# Patient Record
Sex: MALE | Race: WHITE | Employment: OTHER | RURAL
[De-identification: names, ages, dates, MRNs, and addresses within clinical notes are randomized per-mention and may not be internally consistent; named-entity substitution may affect disease eponyms.]

---

## 2017-01-25 ENCOUNTER — OFFICE VISIT (OUTPATIENT)
Dept: FAMILY MEDICINE CLINIC | Age: 52
End: 2017-01-25
Payer: MEDICARE

## 2017-01-25 VITALS
RESPIRATION RATE: 20 BRPM | BODY MASS INDEX: 34.54 KG/M2 | HEIGHT: 72 IN | SYSTOLIC BLOOD PRESSURE: 131 MMHG | HEART RATE: 72 BPM | WEIGHT: 255 LBS | DIASTOLIC BLOOD PRESSURE: 75 MMHG

## 2017-01-25 DIAGNOSIS — R11.0 NAUSEA: ICD-10-CM

## 2017-01-25 DIAGNOSIS — L57.0 ACTINIC KERATOSIS: ICD-10-CM

## 2017-01-25 DIAGNOSIS — H53.8 BLURRED VISION: ICD-10-CM

## 2017-01-25 DIAGNOSIS — I10 ESSENTIAL HYPERTENSION: ICD-10-CM

## 2017-01-25 DIAGNOSIS — M06.9 RHEUMATOID ARTHRITIS, INVOLVING UNSPECIFIED SITE, UNSPECIFIED RHEUMATOID FACTOR PRESENCE: Primary | ICD-10-CM

## 2017-01-25 DIAGNOSIS — G44.85 PRIMARY STABBING HEADACHE: ICD-10-CM

## 2017-01-25 PROCEDURE — 99214 OFFICE O/P EST MOD 30 MIN: CPT | Performed by: FAMILY MEDICINE

## 2017-01-25 RX ORDER — PROMETHAZINE HYDROCHLORIDE 25 MG/1
25 TABLET ORAL EVERY 6 HOURS PRN
Qty: 28 TABLET | Refills: 0 | Status: SHIPPED | OUTPATIENT
Start: 2017-01-25 | End: 2018-05-14 | Stop reason: ALTCHOICE

## 2017-01-25 RX ORDER — LISINOPRIL 5 MG/1
5 TABLET ORAL 2 TIMES DAILY
Qty: 60 TABLET | Refills: 3 | Status: SHIPPED | OUTPATIENT
Start: 2017-01-25 | End: 2017-04-25 | Stop reason: SDUPTHER

## 2017-01-25 RX ORDER — DIAZEPAM 5 MG/1
5 TABLET ORAL EVERY 12 HOURS PRN
Qty: 60 TABLET | Refills: 3 | Status: SHIPPED | OUTPATIENT
Start: 2017-01-25 | End: 2017-02-24

## 2017-01-25 ASSESSMENT — ENCOUNTER SYMPTOMS
VOMITING: 0
BACK PAIN: 1
ALLERGIC/IMMUNOLOGIC NEGATIVE: 1
NAUSEA: 0

## 2017-03-09 ENCOUNTER — OFFICE VISIT (OUTPATIENT)
Dept: FAMILY MEDICINE CLINIC | Age: 52
End: 2017-03-09
Payer: MEDICARE

## 2017-03-09 VITALS
HEART RATE: 81 BPM | RESPIRATION RATE: 18 BRPM | TEMPERATURE: 99.1 F | DIASTOLIC BLOOD PRESSURE: 76 MMHG | SYSTOLIC BLOOD PRESSURE: 133 MMHG | WEIGHT: 258 LBS | HEIGHT: 73 IN | BODY MASS INDEX: 34.19 KG/M2 | OXYGEN SATURATION: 96 %

## 2017-03-09 DIAGNOSIS — J34.89 SINUS DRAINAGE: ICD-10-CM

## 2017-03-09 DIAGNOSIS — J30.89 ALLERGIC RHINITIS DUE TO OTHER ALLERGEN: Primary | ICD-10-CM

## 2017-03-09 DIAGNOSIS — R05.9 COUGH: ICD-10-CM

## 2017-03-09 PROCEDURE — 96372 THER/PROPH/DIAG INJ SC/IM: CPT | Performed by: NURSE PRACTITIONER

## 2017-03-09 PROCEDURE — 99214 OFFICE O/P EST MOD 30 MIN: CPT | Performed by: NURSE PRACTITIONER

## 2017-03-09 RX ORDER — FLUTICASONE PROPIONATE 50 MCG
1 SPRAY, SUSPENSION (ML) NASAL DAILY
Qty: 1 BOTTLE | Refills: 3 | Status: SHIPPED | OUTPATIENT
Start: 2017-03-09

## 2017-03-09 RX ORDER — METHYLPREDNISOLONE ACETATE 80 MG/ML
120 INJECTION, SUSPENSION INTRA-ARTICULAR; INTRALESIONAL; INTRAMUSCULAR; SOFT TISSUE ONCE
Status: COMPLETED | OUTPATIENT
Start: 2017-03-09 | End: 2017-03-09

## 2017-03-09 RX ORDER — LORATADINE 10 MG/1
10 TABLET ORAL DAILY
Qty: 30 TABLET | Refills: 5 | Status: SHIPPED | OUTPATIENT
Start: 2017-03-09 | End: 2017-10-04 | Stop reason: SDUPTHER

## 2017-03-09 RX ORDER — HYDROCODONE POLISTIREX AND CHLORPHENIRAMINE POLISTIREX 10; 8 MG/5ML; MG/5ML
5 SUSPENSION, EXTENDED RELEASE ORAL EVERY 12 HOURS PRN
Qty: 120 ML | Refills: 0 | Status: SHIPPED | OUTPATIENT
Start: 2017-03-09 | End: 2017-09-11 | Stop reason: SDUPTHER

## 2017-03-09 RX ORDER — CEPHALEXIN 500 MG/1
500 CAPSULE ORAL 4 TIMES DAILY
Qty: 40 CAPSULE | Refills: 0 | Status: SHIPPED | OUTPATIENT
Start: 2017-03-09 | End: 2017-03-19

## 2017-03-09 RX ADMIN — METHYLPREDNISOLONE ACETATE 120 MG: 80 INJECTION, SUSPENSION INTRA-ARTICULAR; INTRALESIONAL; INTRAMUSCULAR; SOFT TISSUE at 11:17

## 2017-03-09 ASSESSMENT — ENCOUNTER SYMPTOMS
SINUS PRESSURE: 0
WHEEZING: 1
RHINORRHEA: 1
NAUSEA: 0
VOMITING: 0
BACK PAIN: 1
COUGH: 1
SORE THROAT: 0

## 2017-04-25 ENCOUNTER — HOSPITAL ENCOUNTER (OUTPATIENT)
Dept: OTHER | Age: 52
Discharge: HOME | End: 2017-04-25
Attending: NURSE PRACTITIONER | Admitting: NURSE PRACTITIONER

## 2017-04-25 ENCOUNTER — OFFICE VISIT (OUTPATIENT)
Dept: FAMILY MEDICINE CLINIC | Age: 52
End: 2017-04-25
Payer: MEDICARE

## 2017-04-25 VITALS
DIASTOLIC BLOOD PRESSURE: 78 MMHG | OXYGEN SATURATION: 96 % | HEART RATE: 72 BPM | RESPIRATION RATE: 18 BRPM | SYSTOLIC BLOOD PRESSURE: 130 MMHG

## 2017-04-25 DIAGNOSIS — I10 ESSENTIAL HYPERTENSION: Primary | ICD-10-CM

## 2017-04-25 DIAGNOSIS — M79.7 FIBROMYALGIA: ICD-10-CM

## 2017-04-25 DIAGNOSIS — J34.89 SINUS DRAINAGE: ICD-10-CM

## 2017-04-25 DIAGNOSIS — F41.9 ANXIETY: ICD-10-CM

## 2017-04-25 PROCEDURE — 99214 OFFICE O/P EST MOD 30 MIN: CPT | Performed by: NURSE PRACTITIONER

## 2017-04-25 RX ORDER — DIAZEPAM 5 MG/1
5 TABLET ORAL EVERY 12 HOURS PRN
Qty: 60 TABLET | Refills: 0 | Status: SHIPPED | OUTPATIENT
Start: 2017-04-25 | End: 2017-06-27 | Stop reason: SDUPTHER

## 2017-04-25 RX ORDER — LISINOPRIL 5 MG/1
5 TABLET ORAL 2 TIMES DAILY
Qty: 60 TABLET | Refills: 3 | Status: SHIPPED | OUTPATIENT
Start: 2017-04-25 | End: 2018-02-01 | Stop reason: SDUPTHER

## 2017-04-25 ASSESSMENT — ENCOUNTER SYMPTOMS
ALLERGIC/IMMUNOLOGIC NEGATIVE: 1
SHORTNESS OF BREATH: 0
EYES NEGATIVE: 1
BLURRED VISION: 0
GASTROINTESTINAL NEGATIVE: 1
ORTHOPNEA: 0

## 2017-04-28 DIAGNOSIS — I10 ESSENTIAL HYPERTENSION: ICD-10-CM

## 2017-05-26 ENCOUNTER — OFFICE VISIT (OUTPATIENT)
Dept: FAMILY MEDICINE CLINIC | Age: 52
End: 2017-05-26
Payer: MEDICARE

## 2017-05-26 VITALS
DIASTOLIC BLOOD PRESSURE: 84 MMHG | SYSTOLIC BLOOD PRESSURE: 129 MMHG | OXYGEN SATURATION: 96 % | RESPIRATION RATE: 18 BRPM | HEART RATE: 75 BPM

## 2017-05-26 DIAGNOSIS — R06.02 SHORTNESS OF BREATH ON EXERTION: Primary | ICD-10-CM

## 2017-05-26 DIAGNOSIS — R94.31 EKG, ABNORMAL: ICD-10-CM

## 2017-05-26 DIAGNOSIS — F41.9 ANXIETY: ICD-10-CM

## 2017-05-26 DIAGNOSIS — M06.9 RHEUMATOID ARTHRITIS, INVOLVING UNSPECIFIED SITE, UNSPECIFIED RHEUMATOID FACTOR PRESENCE: ICD-10-CM

## 2017-05-26 PROCEDURE — 99214 OFFICE O/P EST MOD 30 MIN: CPT | Performed by: NURSE PRACTITIONER

## 2017-05-26 PROCEDURE — 93000 ELECTROCARDIOGRAM COMPLETE: CPT | Performed by: NURSE PRACTITIONER

## 2017-05-26 RX ORDER — DIAZEPAM 5 MG/1
5 TABLET ORAL EVERY 12 HOURS PRN
Qty: 60 TABLET | Refills: 0 | Status: CANCELLED | OUTPATIENT
Start: 2017-05-26 | End: 2017-06-25

## 2017-05-26 ASSESSMENT — ENCOUNTER SYMPTOMS
SHORTNESS OF BREATH: 1
CHEST TIGHTNESS: 0
GASTROINTESTINAL NEGATIVE: 1
WHEEZING: 0
ORTHOPNEA: 0
EYES NEGATIVE: 1
STRIDOR: 0
ALLERGIC/IMMUNOLOGIC NEGATIVE: 1

## 2017-06-27 ENCOUNTER — HOSPITAL ENCOUNTER (OUTPATIENT)
Dept: OTHER | Age: 52
Discharge: HOME | End: 2017-06-27
Attending: NURSE PRACTITIONER | Admitting: NURSE PRACTITIONER

## 2017-06-27 ENCOUNTER — OFFICE VISIT (OUTPATIENT)
Dept: FAMILY MEDICINE CLINIC | Age: 52
End: 2017-06-27
Payer: MEDICARE

## 2017-06-27 VITALS
DIASTOLIC BLOOD PRESSURE: 95 MMHG | OXYGEN SATURATION: 98 % | RESPIRATION RATE: 18 BRPM | WEIGHT: 276 LBS | HEART RATE: 84 BPM | SYSTOLIC BLOOD PRESSURE: 139 MMHG | BODY MASS INDEX: 36.58 KG/M2 | HEIGHT: 73 IN

## 2017-06-27 DIAGNOSIS — F41.9 ANXIETY: ICD-10-CM

## 2017-06-27 DIAGNOSIS — R63.5 WEIGHT GAIN: Primary | ICD-10-CM

## 2017-06-27 DIAGNOSIS — Z11.59 NEED FOR HEPATITIS C SCREENING TEST: ICD-10-CM

## 2017-06-27 DIAGNOSIS — R63.5 WEIGHT GAIN: ICD-10-CM

## 2017-06-27 DIAGNOSIS — R63.5 ABNORMAL WEIGHT GAIN: ICD-10-CM

## 2017-06-27 DIAGNOSIS — M06.9 RHEUMATOID ARTHRITIS, INVOLVING UNSPECIFIED SITE, UNSPECIFIED RHEUMATOID FACTOR PRESENCE: ICD-10-CM

## 2017-06-27 DIAGNOSIS — R60.9 EDEMA, UNSPECIFIED TYPE: ICD-10-CM

## 2017-06-27 DIAGNOSIS — I10 ESSENTIAL HYPERTENSION: ICD-10-CM

## 2017-06-27 PROCEDURE — 99214 OFFICE O/P EST MOD 30 MIN: CPT | Performed by: NURSE PRACTITIONER

## 2017-06-27 RX ORDER — HYDROCHLOROTHIAZIDE 25 MG/1
25 TABLET ORAL DAILY
Qty: 30 TABLET | Refills: 0 | Status: SHIPPED | OUTPATIENT
Start: 2017-06-27 | End: 2017-07-11 | Stop reason: SDUPTHER

## 2017-06-27 RX ORDER — DIAZEPAM 5 MG/1
5 TABLET ORAL EVERY 12 HOURS PRN
Qty: 60 TABLET | Refills: 0 | Status: SHIPPED | OUTPATIENT
Start: 2017-06-27 | End: 2017-07-11 | Stop reason: SDUPTHER

## 2017-06-27 ASSESSMENT — ENCOUNTER SYMPTOMS
ALLERGIC/IMMUNOLOGIC NEGATIVE: 1
EYES NEGATIVE: 1
NAUSEA: 0
SHORTNESS OF BREATH: 0
ABDOMINAL PAIN: 0
VOMITING: 0
COUGH: 0
DIARRHEA: 0

## 2017-07-11 ENCOUNTER — OFFICE VISIT (OUTPATIENT)
Dept: FAMILY MEDICINE CLINIC | Age: 52
End: 2017-07-11
Payer: MEDICARE

## 2017-07-11 VITALS
OXYGEN SATURATION: 95 % | SYSTOLIC BLOOD PRESSURE: 128 MMHG | RESPIRATION RATE: 18 BRPM | HEART RATE: 71 BPM | DIASTOLIC BLOOD PRESSURE: 82 MMHG

## 2017-07-11 DIAGNOSIS — R60.9 EDEMA, UNSPECIFIED TYPE: ICD-10-CM

## 2017-07-11 DIAGNOSIS — R25.2 MUSCLE CRAMPS: ICD-10-CM

## 2017-07-11 DIAGNOSIS — I10 ESSENTIAL HYPERTENSION: ICD-10-CM

## 2017-07-11 DIAGNOSIS — F41.9 ANXIETY: Primary | ICD-10-CM

## 2017-07-11 PROCEDURE — 99214 OFFICE O/P EST MOD 30 MIN: CPT | Performed by: NURSE PRACTITIONER

## 2017-07-11 RX ORDER — HYDROCHLOROTHIAZIDE 25 MG/1
25 TABLET ORAL DAILY
Qty: 30 TABLET | Refills: 5 | Status: SHIPPED | OUTPATIENT
Start: 2017-07-11 | End: 2018-06-14

## 2017-07-11 RX ORDER — DIAZEPAM 5 MG/1
5 TABLET ORAL EVERY 12 HOURS PRN
Qty: 60 TABLET | Refills: 0 | Status: SHIPPED | OUTPATIENT
Start: 2017-07-11 | End: 2017-08-11 | Stop reason: SDUPTHER

## 2017-07-11 RX ORDER — METHOCARBAMOL 500 MG/1
500 TABLET, FILM COATED ORAL 4 TIMES DAILY PRN
Qty: 90 TABLET | Refills: 5 | Status: SHIPPED | OUTPATIENT
Start: 2017-07-11 | End: 2017-08-10

## 2017-07-11 ASSESSMENT — ENCOUNTER SYMPTOMS
NAUSEA: 0
COUGH: 0
RESPIRATORY NEGATIVE: 1
ALLERGIC/IMMUNOLOGIC NEGATIVE: 1
ABDOMINAL PAIN: 0
VOMITING: 0
DIARRHEA: 0
EYES NEGATIVE: 1
SHORTNESS OF BREATH: 0

## 2017-08-11 ENCOUNTER — OFFICE VISIT (OUTPATIENT)
Dept: FAMILY MEDICINE CLINIC | Age: 52
End: 2017-08-11
Payer: MEDICARE

## 2017-08-11 VITALS
DIASTOLIC BLOOD PRESSURE: 82 MMHG | SYSTOLIC BLOOD PRESSURE: 110 MMHG | RESPIRATION RATE: 18 BRPM | OXYGEN SATURATION: 96 % | HEART RATE: 89 BPM

## 2017-08-11 DIAGNOSIS — R60.9 EDEMA, UNSPECIFIED TYPE: ICD-10-CM

## 2017-08-11 DIAGNOSIS — F41.9 ANXIETY: Primary | ICD-10-CM

## 2017-08-11 PROCEDURE — 99214 OFFICE O/P EST MOD 30 MIN: CPT | Performed by: NURSE PRACTITIONER

## 2017-08-11 RX ORDER — TRIAMTERENE AND HYDROCHLOROTHIAZIDE 37.5; 25 MG/1; MG/1
TABLET ORAL
Refills: 11 | COMMUNITY
Start: 2017-07-25 | End: 2017-10-10 | Stop reason: ALTCHOICE

## 2017-08-11 RX ORDER — DIAZEPAM 5 MG/1
5 TABLET ORAL EVERY 12 HOURS PRN
Qty: 60 TABLET | Refills: 0 | Status: SHIPPED | OUTPATIENT
Start: 2017-08-11 | End: 2017-09-11 | Stop reason: SDUPTHER

## 2017-08-11 ASSESSMENT — ENCOUNTER SYMPTOMS
GASTROINTESTINAL NEGATIVE: 1
RESPIRATORY NEGATIVE: 1
ALLERGIC/IMMUNOLOGIC NEGATIVE: 1
EYES NEGATIVE: 1

## 2017-09-11 ENCOUNTER — OFFICE VISIT (OUTPATIENT)
Dept: FAMILY MEDICINE CLINIC | Age: 52
End: 2017-09-11
Payer: MEDICARE

## 2017-09-11 VITALS
HEART RATE: 69 BPM | SYSTOLIC BLOOD PRESSURE: 124 MMHG | DIASTOLIC BLOOD PRESSURE: 80 MMHG | OXYGEN SATURATION: 95 % | RESPIRATION RATE: 18 BRPM

## 2017-09-11 DIAGNOSIS — R60.9 PERIPHERAL EDEMA: ICD-10-CM

## 2017-09-11 DIAGNOSIS — F41.9 ANXIETY: Primary | ICD-10-CM

## 2017-09-11 DIAGNOSIS — R05.9 COUGH: ICD-10-CM

## 2017-09-11 PROCEDURE — 99214 OFFICE O/P EST MOD 30 MIN: CPT | Performed by: NURSE PRACTITIONER

## 2017-09-11 RX ORDER — DIAZEPAM 5 MG/1
5 TABLET ORAL EVERY 12 HOURS PRN
Qty: 60 TABLET | Refills: 0 | Status: SHIPPED | OUTPATIENT
Start: 2017-09-11 | End: 2017-10-10 | Stop reason: SDUPTHER

## 2017-09-11 RX ORDER — HYDROCODONE POLISTIREX AND CHLORPHENIRAMINE POLISTIREX 10; 8 MG/5ML; MG/5ML
5 SUSPENSION, EXTENDED RELEASE ORAL EVERY 12 HOURS PRN
Qty: 120 ML | Refills: 0 | Status: SHIPPED | OUTPATIENT
Start: 2017-09-11 | End: 2017-10-10 | Stop reason: ALTCHOICE

## 2017-09-11 ASSESSMENT — ENCOUNTER SYMPTOMS
ALLERGIC/IMMUNOLOGIC NEGATIVE: 1
EYES NEGATIVE: 1
RESPIRATORY NEGATIVE: 1
GASTROINTESTINAL NEGATIVE: 1

## 2017-10-04 DIAGNOSIS — J30.89 ALLERGIC RHINITIS DUE TO OTHER ALLERGEN: ICD-10-CM

## 2017-10-04 DIAGNOSIS — J34.89 SINUS DRAINAGE: ICD-10-CM

## 2017-10-04 RX ORDER — LORATADINE 10 MG/1
10 TABLET ORAL DAILY
Qty: 30 TABLET | Refills: 5 | Status: SHIPPED | OUTPATIENT
Start: 2017-10-04 | End: 2018-06-14

## 2017-10-10 ENCOUNTER — HOSPITAL ENCOUNTER (OUTPATIENT)
Dept: OTHER | Age: 52
Discharge: HOME | End: 2017-10-10
Attending: NURSE PRACTITIONER | Admitting: NURSE PRACTITIONER

## 2017-10-10 ENCOUNTER — OFFICE VISIT (OUTPATIENT)
Dept: FAMILY MEDICINE CLINIC | Age: 52
End: 2017-10-10
Payer: MEDICARE

## 2017-10-10 VITALS
HEIGHT: 73 IN | BODY MASS INDEX: 36.18 KG/M2 | HEART RATE: 67 BPM | DIASTOLIC BLOOD PRESSURE: 76 MMHG | OXYGEN SATURATION: 91 % | SYSTOLIC BLOOD PRESSURE: 118 MMHG | WEIGHT: 273 LBS | RESPIRATION RATE: 18 BRPM

## 2017-10-10 DIAGNOSIS — F41.9 ANXIETY: ICD-10-CM
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
## 2017-10-10 DIAGNOSIS — M25.511 ACUTE PAIN OF RIGHT SHOULDER: ICD-10-CM

## 2017-10-10 DIAGNOSIS — J30.89 ALLERGIC RHINITIS DUE TO OTHER ALLERGEN: ICD-10-CM

## 2017-10-10 PROCEDURE — 99214 OFFICE O/P EST MOD 30 MIN: CPT | Performed by: NURSE PRACTITIONER

## 2017-10-10 RX ORDER — DIAZEPAM 5 MG/1
5 TABLET ORAL EVERY 12 HOURS PRN
Qty: 60 TABLET | Refills: 0 | Status: SHIPPED | OUTPATIENT
Start: 2017-10-10 | End: 2018-01-11 | Stop reason: SDUPTHER

## 2017-10-10 ASSESSMENT — ENCOUNTER SYMPTOMS
EYES NEGATIVE: 1
ALLERGIC/IMMUNOLOGIC NEGATIVE: 1
GASTROINTESTINAL NEGATIVE: 1
RESPIRATORY NEGATIVE: 1

## 2017-10-10 NOTE — PROGRESS NOTES
SUBJECTIVE:    Geovanna Roberts is a 47 y.o. male    Anxiety: Patient complains of evaluation of anxiety disorder. He has the following anxiety symptoms: anxious, nervous. Onset of symptoms was approximately several years ago, stable since that time. He denies current suicidal and homicidal ideation. Family history significant for anxiety and depression. Possible organic causes contributing are: none. Risk factors: positive family history in  mother and negative life event loss of family members. Previous treatment includes benzodiazepines Valium 5mg BID PRN and none. He complains of the following side effects from the treatment: none. Pt reports loratadine was switched to Desloratadine 5mg daily due to formulary change on 10/04/17. Pt reports it doesn't seem to be working as well as loratadine, but he does agree to give it a little longer to work. Pt reports allergies are starting to improve because all the plants are \"dying out\". Pt has an appt with Dr Alfa Salas on 11/2/17 for bilateral shoulder injections due to shoulder arthritis. Shoulder Pain    The pain is present in the right shoulder. This is a new problem. The current episode started 1 to 4 weeks ago (9 days). There has been a history of trauma. The problem occurs constantly. The problem has been gradually improving. The quality of the pain is described as aching. The pain is at a severity of 3/10. The pain is mild. Associated symptoms include joint locking (chronic) and stiffness (chronic). The symptoms are aggravated by lying down. He has tried oral narcotics for the symptoms. The treatment provided significant relief. His past medical history is significant for rheumatoid arthritis. Chief Complaint   Patient presents with    Allergies     Pt states we called in his Claritan the other day and the pharmacy changed it due to insurance not covering it.  He states the new medication is not as good as what he was taking before   [facility] Shoulder Pain     Pt states 9 days ago he fell and hurt his right shoulder. He states he has used biofreeze and tried to relieve pain. He states it is getting some better but not completely better. He would like to get it x-rayd    Anxiety     Refill        Review of Systems   Constitutional: Negative. HENT: Negative. Eyes: Negative. Respiratory: Negative. Cardiovascular: Negative. Gastrointestinal: Negative. Endocrine: Negative. Genitourinary: Negative. Musculoskeletal: Positive for arthralgias (right shoulder) and stiffness (chronic). Skin: Negative. Allergic/Immunologic: Negative. Neurological: Negative. Hematological: Negative. Psychiatric/Behavioral: The patient is nervous/anxious (adequately managed with Valium 5mg BID). OBJECTIVE:    /76   Pulse 67   Resp 18   Ht 6' 1\" (1.854 m)   Wt 273 lb (123.8 kg)   SpO2 91%   BMI 36.02 kg/m²    Physical Exam   Constitutional: He is oriented to person, place, and time. He appears well-developed and well-nourished. HENT:   Head: Normocephalic. Neck: Carotid bruit is not present. No thyromegaly present. Cardiovascular: Normal rate, regular rhythm, normal heart sounds and intact distal pulses. No murmur heard. Pulmonary/Chest: Effort normal and breath sounds normal.   Musculoskeletal:        Right shoulder: He exhibits decreased range of motion, tenderness, crepitus and pain. He exhibits no bony tenderness, no swelling, no effusion, no deformity, no laceration, no spasm, normal pulse and normal strength. Neurological: He is alert and oriented to person, place, and time. Skin: Skin is warm and dry. Psychiatric: He has a normal mood and affect. His behavior is normal. Judgment and thought content normal.   Nursing note and vitals reviewed. ASSESSMENT/PLAN:   Jay Jay Olivo was seen today for allergies, shoulder pain and anxiety.     Diagnoses and all orders for this visit:    Fall, initial encounter  - XR Shoulder Right 2 VW; Future    Anxiety  -     diazepam (VALIUM) 5 MG tablet; Take 1 tablet by mouth every 12 hours as needed for Anxiety    Acute pain of right shoulder  -     XR Shoulder Right 2 VW; Future    Allergic rhinitis due to other allergen  - Continue Desloratadine 5mg daily for 1 month. Re assess in 1 month. Follow up sooner if needed. Controlled Substances Monitoring:     Attestation: The Prescription Monitoring Report for this patient was reviewed today. Roslyn Burnett NP)  Documentation: Possible medication side effects, risk of tolerance and/or dependence, and alternative treatments discussed., Obtaining appropriate analgesic effect of treatment., No signs of potential drug abuse or diversion identified., Existing medication contract. Roslyn Burnett NP)    Return in about 1 month (around 11/10/2017), or if symptoms worsen or fail to improve. Current Outpatient Prescriptions on File Prior to Visit   Medication Sig Dispense Refill    loratadine (CLARITIN) 10 MG tablet Take 1 tablet by mouth daily 30 tablet 5    hydrochlorothiazide (HYDRODIURIL) 25 MG tablet Take 1 tablet by mouth daily 30 tablet 5    lisinopril (PRINIVIL;ZESTRIL) 5 MG tablet Take 1 tablet by mouth 2 times daily 60 tablet 3    promethazine (PHENERGAN) 25 MG tablet Take 1 tablet by mouth every 6 hours as needed for Nausea 28 tablet 0    hydrocodone-acetaminophen (LORTAB)  MG per tablet Take 1 tablet by mouth every 6 hours as needed.  esomeprazole (NEXIUM) 40 MG capsule Take 40 mg by mouth every morning (before breakfast).  fluoxetine (PROZAC) 20 MG capsule Take 40 mg by mouth daily.         fluticasone (FLONASE) 50 MCG/ACT nasal spray 1 spray by Nasal route daily 1 Bottle 3     Current Facility-Administered Medications on File Prior to Visit   Medication Dose Route Frequency Provider Last Rate Last Dose    methylPREDNISolone acetate (DEPO-MEDROL) injection 80 mg  80 mg Intramuscular Once Daleen Nissen Urban Negrete MD

## 2018-01-11 DIAGNOSIS — F41.9 ANXIETY: ICD-10-CM

## 2018-01-11 RX ORDER — DIAZEPAM 5 MG/1
5 TABLET ORAL EVERY 12 HOURS PRN
Qty: 60 TABLET | Refills: 0 | Status: SHIPPED | OUTPATIENT
Start: 2018-01-11 | End: 2018-02-12 | Stop reason: SDUPTHER

## 2018-01-30 ENCOUNTER — TELEPHONE (OUTPATIENT)
Dept: FAMILY MEDICINE CLINIC | Age: 53
End: 2018-01-30

## 2018-01-30 DIAGNOSIS — I10 ESSENTIAL HYPERTENSION: ICD-10-CM

## 2018-02-01 RX ORDER — LISINOPRIL 5 MG/1
5 TABLET ORAL 2 TIMES DAILY
Qty: 60 TABLET | Refills: 3 | Status: SHIPPED | OUTPATIENT
Start: 2018-02-01 | End: 2018-06-14 | Stop reason: SDUPTHER

## 2018-02-12 ENCOUNTER — OFFICE VISIT (OUTPATIENT)
Dept: FAMILY MEDICINE CLINIC | Age: 53
End: 2018-02-12
Payer: MEDICARE

## 2018-02-12 ENCOUNTER — HOSPITAL ENCOUNTER (OUTPATIENT)
Dept: OTHER | Age: 53
Discharge: HOME | End: 2018-02-12
Attending: NURSE PRACTITIONER | Admitting: NURSE PRACTITIONER

## 2018-02-12 VITALS
TEMPERATURE: 98.8 F | HEIGHT: 73 IN | BODY MASS INDEX: 35.76 KG/M2 | OXYGEN SATURATION: 96 % | SYSTOLIC BLOOD PRESSURE: 122 MMHG | HEART RATE: 77 BPM | WEIGHT: 269.8 LBS | DIASTOLIC BLOOD PRESSURE: 82 MMHG

## 2018-02-12 DIAGNOSIS — F41.9 ANXIETY: Primary | ICD-10-CM

## 2018-02-12 DIAGNOSIS — M25.511 CHRONIC RIGHT SHOULDER PAIN: ICD-10-CM

## 2018-02-12 DIAGNOSIS — I10 ESSENTIAL HYPERTENSION: ICD-10-CM

## 2018-02-12 DIAGNOSIS — R53.83 FATIGUE, UNSPECIFIED TYPE: ICD-10-CM

## 2018-02-12 DIAGNOSIS — Z13.220 NEED FOR LIPID SCREENING: ICD-10-CM

## 2018-02-12 DIAGNOSIS — K43.9 VENTRAL HERNIA WITHOUT OBSTRUCTION OR GANGRENE: ICD-10-CM

## 2018-02-12 DIAGNOSIS — G89.29 CHRONIC RIGHT SHOULDER PAIN: ICD-10-CM

## 2018-02-12 PROCEDURE — G8484 FLU IMMUNIZE NO ADMIN: HCPCS | Performed by: NURSE PRACTITIONER

## 2018-02-12 PROCEDURE — G8417 CALC BMI ABV UP PARAM F/U: HCPCS | Performed by: NURSE PRACTITIONER

## 2018-02-12 PROCEDURE — 99214 OFFICE O/P EST MOD 30 MIN: CPT | Performed by: NURSE PRACTITIONER

## 2018-02-12 PROCEDURE — 4004F PT TOBACCO SCREEN RCVD TLK: CPT | Performed by: NURSE PRACTITIONER

## 2018-02-12 PROCEDURE — G8427 DOCREV CUR MEDS BY ELIG CLIN: HCPCS | Performed by: NURSE PRACTITIONER

## 2018-02-12 PROCEDURE — 3017F COLORECTAL CA SCREEN DOC REV: CPT | Performed by: NURSE PRACTITIONER

## 2018-02-12 RX ORDER — DIAZEPAM 5 MG/1
5 TABLET ORAL EVERY 12 HOURS PRN
Qty: 60 TABLET | Refills: 0 | Status: SHIPPED | OUTPATIENT
Start: 2018-02-12 | End: 2018-03-12 | Stop reason: SDUPTHER

## 2018-02-12 ASSESSMENT — ENCOUNTER SYMPTOMS
CHEST TIGHTNESS: 0
SHORTNESS OF BREATH: 0
COUGH: 0
GASTROINTESTINAL NEGATIVE: 1
ALLERGIC/IMMUNOLOGIC NEGATIVE: 1
EYES NEGATIVE: 1

## 2018-03-12 ENCOUNTER — OFFICE VISIT (OUTPATIENT)
Dept: FAMILY MEDICINE CLINIC | Age: 53
End: 2018-03-12
Payer: MEDICARE

## 2018-03-12 VITALS
WEIGHT: 264 LBS | HEART RATE: 87 BPM | SYSTOLIC BLOOD PRESSURE: 125 MMHG | DIASTOLIC BLOOD PRESSURE: 80 MMHG | RESPIRATION RATE: 18 BRPM | OXYGEN SATURATION: 93 % | HEIGHT: 73 IN | BODY MASS INDEX: 34.99 KG/M2

## 2018-03-12 DIAGNOSIS — R79.89 LOW TESTOSTERONE IN MALE: ICD-10-CM

## 2018-03-12 DIAGNOSIS — M48.061 SPINAL STENOSIS OF LUMBAR REGION, UNSPECIFIED WHETHER NEUROGENIC CLAUDICATION PRESENT: Primary | ICD-10-CM

## 2018-03-12 DIAGNOSIS — F41.9 ANXIETY: Primary | ICD-10-CM

## 2018-03-12 DIAGNOSIS — F41.9 ANXIETY: ICD-10-CM

## 2018-03-12 PROCEDURE — G8484 FLU IMMUNIZE NO ADMIN: HCPCS | Performed by: NURSE PRACTITIONER

## 2018-03-12 PROCEDURE — 3017F COLORECTAL CA SCREEN DOC REV: CPT | Performed by: NURSE PRACTITIONER

## 2018-03-12 PROCEDURE — G8417 CALC BMI ABV UP PARAM F/U: HCPCS | Performed by: NURSE PRACTITIONER

## 2018-03-12 PROCEDURE — G8427 DOCREV CUR MEDS BY ELIG CLIN: HCPCS | Performed by: NURSE PRACTITIONER

## 2018-03-12 PROCEDURE — 4004F PT TOBACCO SCREEN RCVD TLK: CPT | Performed by: NURSE PRACTITIONER

## 2018-03-12 PROCEDURE — 99213 OFFICE O/P EST LOW 20 MIN: CPT | Performed by: NURSE PRACTITIONER

## 2018-03-12 RX ORDER — HYDROCODONE BITARTRATE AND ACETAMINOPHEN 10; 325 MG/1; MG/1
1 TABLET ORAL EVERY 6 HOURS PRN
Qty: 120 TABLET | Refills: 0 | Status: CANCELLED | OUTPATIENT
Start: 2018-03-12 | End: 2018-04-11

## 2018-03-12 RX ORDER — DIAZEPAM 5 MG/1
5 TABLET ORAL EVERY 12 HOURS PRN
Qty: 60 TABLET | Refills: 0 | Status: SHIPPED | OUTPATIENT
Start: 2018-03-12 | End: 2018-04-11

## 2018-03-12 RX ORDER — GEMFIBROZIL 600 MG/1
600 TABLET, FILM COATED ORAL
Qty: 60 TABLET | Refills: 3 | Status: SHIPPED | OUTPATIENT
Start: 2018-03-12 | End: 2018-12-27

## 2018-03-12 ASSESSMENT — ENCOUNTER SYMPTOMS
ALLERGIC/IMMUNOLOGIC NEGATIVE: 1
SHORTNESS OF BREATH: 0
BACK PAIN: 1
COUGH: 1
EYES NEGATIVE: 1
GASTROINTESTINAL NEGATIVE: 1

## 2018-03-12 NOTE — PATIENT INSTRUCTIONS
Education and Discharge Instructions:  Keep a list of your medicines with you at all times. Always bring a up to date list or the medication bottles when going to the doctor or hospital.   Always follow the directions on your medications unless the doctor or nurse has instructed you otherwise. Keep all medications out of reach of children. Store medicines according to the directions on the label. Seek emergency medical attention if you think you have used too much medication. A overdose can be fatal.  Don't share your medicines with anyone. It may harm them. Discard any unused or out dated medications. If you have any questions, ask your provider or pharmacist for more information. Be sure to keep all appointments for provider visits or tests. Please continue all your medications that the Provider has prescribed for you unless other [facility]    1. Mental Health Info and Treatment [phone]  2. Drug and Alcohol Detox Rehab treatment 24 hr [phone]  3. Stop Smoking Classes- [facility]-[phone]  4. [address] Program- prescription [id number]-[phone]  5. Hospice Care [phone]  6. Adult/Child Protection [phone]          . We are committed to providing you with the best care possible. In order to help us achieve these goals please remember to bring all medications, herbal products, and over the counter supplements with you to each visit. If your provider has ordered testing for you, please be sure to follow up with our office if you have not received results within 7 days after the testing took place. *If you receive a survey after visiting one of our offices, please take time to share your experience concerning your physician office visit. These surveys are confidential and no health information about you is shared.   We are eager to improve for you and we are counting on your

## 2018-03-12 NOTE — PROGRESS NOTES
SUBJECTIVE:    Fifi Bernal is a 46 y.o. y/o male . Chief Complaint   Patient presents with    Anxiety     follow up, discuss lab results    Arthritis    Medication Refill        HPI:   Presents today for follow up for anxiety and refill of valium. He reports that he takes his valium as prescribed it is effective for relief of anxiety symptoms. He has not noticed any adverse effects. Also here for review of labs from his c/o fatigue and lack of energy at last visit that has been ongoing for 3-4 years. Fatigue   This is a chronic problem. The current episode started more than 1 year ago. The problem occurs constantly. The problem has been gradually worsening. Associated symptoms include coughing and fatigue. Pertinent negatives include no chest pain. Nothing aggravates the symptoms. He has tried nothing for the symptoms. The treatment provided no relief. Anxiety: Patient complains of evaluation of anxiety disorder. He has the following anxiety symptoms: anxious and nervous. Onset of symptoms was approximately several years ago, stable since that time. He denies current suicidal and homicidal ideation. Family history significant for anxiety and depression. Possible organic causes contributing are: none. Risk factors: positive family history in  mother and negative life event loss of family members Previous treatment includes Valium and none.   He complains of the following side effects from the treatment: dry mouth.  '    Allergies   Allergen Reactions    Dicyclomine Hcl     Pcn [Penicillins]     Tolectin [Tolmetin Sodium]       Past Medical History:   Diagnosis Date    Arthritis     Chronic back pain     r/t arthritis    Depression     Fibromyalgia     Reflux esophagitis     Spinal stenosis       Family History   Problem Relation Age of Onset    Arthritis Mother     Asthma Mother     Heart Disease Mother     High Blood Pressure Mother     Arthritis Father     Heart Disease normal mood and affect. His behavior is normal. Judgment and thought content normal.      No results found for: TESTOSTERONE    ASSESSMENT / PLAN    1. Anxiety  Controlled with current medications. Will cont,   - diazepam (VALIUM) 5 MG tablet    2. Low testosterone in male  Total Testosterone 183 (low), Free Testosterone 7.2 (low end of normal.  - External Referral To Urology     Controlled Substances Monitoring:     Possible medication side effects, risk of tolerance/dependence & alternative treatments discussed., No signs of potential drug abuse or diversion identified. (Valene Record, APRN)    Existing medication contract. (Valene Record, APRN)    No Follow-up on file.

## 2018-04-05 ENCOUNTER — TELEPHONE (OUTPATIENT)
Dept: FAMILY MEDICINE CLINIC | Age: 53
End: 2018-04-05

## 2018-04-05 DIAGNOSIS — J30.89 CHRONIC NON-SEASONAL ALLERGIC RHINITIS, UNSPECIFIED TRIGGER: Primary | ICD-10-CM

## 2018-04-05 RX ORDER — DESLORATADINE 5 MG/1
5 TABLET ORAL DAILY
Qty: 90 TABLET | Refills: 1 | Status: SHIPPED | OUTPATIENT
Start: 2018-04-05 | End: 2019-01-07 | Stop reason: SDUPTHER

## 2018-04-12 ENCOUNTER — OFFICE VISIT (OUTPATIENT)
Dept: FAMILY MEDICINE CLINIC | Age: 53
End: 2018-04-12
Payer: MEDICARE

## 2018-04-12 DIAGNOSIS — F41.9 ANXIETY: Primary | ICD-10-CM

## 2018-04-12 PROCEDURE — 3017F COLORECTAL CA SCREEN DOC REV: CPT | Performed by: NURSE PRACTITIONER

## 2018-04-12 PROCEDURE — 99213 OFFICE O/P EST LOW 20 MIN: CPT | Performed by: NURSE PRACTITIONER

## 2018-04-12 PROCEDURE — G8417 CALC BMI ABV UP PARAM F/U: HCPCS | Performed by: NURSE PRACTITIONER

## 2018-04-12 PROCEDURE — 4004F PT TOBACCO SCREEN RCVD TLK: CPT | Performed by: NURSE PRACTITIONER

## 2018-04-12 PROCEDURE — G8427 DOCREV CUR MEDS BY ELIG CLIN: HCPCS | Performed by: NURSE PRACTITIONER

## 2018-04-12 RX ORDER — DIAZEPAM 5 MG/1
5 TABLET ORAL EVERY 12 HOURS PRN
Qty: 60 TABLET | Refills: 0 | Status: SHIPPED | OUTPATIENT
Start: 2018-04-12 | End: 2018-05-14 | Stop reason: SDUPTHER

## 2018-04-12 RX ORDER — OMEPRAZOLE 40 MG/1
1 CAPSULE, DELAYED RELEASE ORAL DAILY
Refills: 6 | COMMUNITY
Start: 2018-03-11 | End: 2019-10-16

## 2018-04-12 RX ORDER — ASPIRIN 81 MG/1
1 TABLET ORAL DAILY
Refills: 11 | COMMUNITY
Start: 2018-01-26

## 2018-04-12 RX ORDER — HYDROCODONE BITARTRATE AND ACETAMINOPHEN 10; 325 MG/1; MG/1
1 TABLET ORAL 3 TIMES DAILY PRN
Refills: 0 | COMMUNITY
Start: 2018-04-02 | End: 2018-12-27

## 2018-04-12 RX ORDER — IPRATROPIUM BROMIDE 21 UG/1
1 SPRAY, METERED NASAL 2 TIMES DAILY
Refills: 3 | COMMUNITY
Start: 2018-03-12 | End: 2020-03-03

## 2018-04-12 RX ORDER — MONTELUKAST SODIUM 10 MG/1
10 TABLET ORAL NIGHTLY
COMMUNITY
End: 2020-03-03 | Stop reason: SDUPTHER

## 2018-04-12 RX ORDER — MONTELUKAST SODIUM 10 MG/1
1 TABLET ORAL 3 TIMES DAILY
Refills: 6 | COMMUNITY
Start: 2018-03-12 | End: 2018-04-12 | Stop reason: CLARIF

## 2018-04-12 RX ORDER — TRIAMTERENE AND HYDROCHLOROTHIAZIDE 37.5; 25 MG/1; MG/1
1 TABLET ORAL DAILY
Refills: 11 | COMMUNITY
Start: 2018-03-12 | End: 2018-08-23 | Stop reason: SDUPTHER

## 2018-04-12 RX ORDER — DIAZEPAM 5 MG/1
5 TABLET ORAL EVERY 6 HOURS PRN
COMMUNITY
End: 2018-04-12 | Stop reason: SDUPTHER

## 2018-04-12 RX ORDER — FLUTICASONE FUROATE AND VILANTEROL TRIFENATATE 100; 25 UG/1; UG/1
POWDER RESPIRATORY (INHALATION)
Refills: 3 | COMMUNITY
Start: 2018-02-16 | End: 2018-05-14 | Stop reason: ALTCHOICE

## 2018-04-12 ASSESSMENT — ENCOUNTER SYMPTOMS: COUGH: 1

## 2018-05-01 VITALS
WEIGHT: 268.6 LBS | SYSTOLIC BLOOD PRESSURE: 136 MMHG | HEIGHT: 73 IN | RESPIRATION RATE: 18 BRPM | OXYGEN SATURATION: 92 % | HEART RATE: 64 BPM | DIASTOLIC BLOOD PRESSURE: 76 MMHG | BODY MASS INDEX: 35.6 KG/M2

## 2018-05-01 ASSESSMENT — ENCOUNTER SYMPTOMS
ALLERGIC/IMMUNOLOGIC NEGATIVE: 1
EYES NEGATIVE: 1
CHEST TIGHTNESS: 0
SHORTNESS OF BREATH: 0

## 2018-05-14 ENCOUNTER — OFFICE VISIT (OUTPATIENT)
Dept: FAMILY MEDICINE CLINIC | Age: 53
End: 2018-05-14
Payer: MEDICARE

## 2018-05-14 VITALS
SYSTOLIC BLOOD PRESSURE: 118 MMHG | HEIGHT: 73 IN | RESPIRATION RATE: 18 BRPM | DIASTOLIC BLOOD PRESSURE: 76 MMHG | OXYGEN SATURATION: 92 % | BODY MASS INDEX: 35.84 KG/M2 | HEART RATE: 78 BPM | WEIGHT: 270.4 LBS

## 2018-05-14 DIAGNOSIS — H66.001 ACUTE SUPPURATIVE OTITIS MEDIA OF RIGHT EAR WITHOUT SPONTANEOUS RUPTURE OF TYMPANIC MEMBRANE, RECURRENCE NOT SPECIFIED: Primary | ICD-10-CM

## 2018-05-14 DIAGNOSIS — Z13.31 POSITIVE DEPRESSION SCREENING: ICD-10-CM

## 2018-05-14 DIAGNOSIS — F41.9 ANXIETY: ICD-10-CM

## 2018-05-14 PROCEDURE — 99214 OFFICE O/P EST MOD 30 MIN: CPT | Performed by: NURSE PRACTITIONER

## 2018-05-14 PROCEDURE — 3017F COLORECTAL CA SCREEN DOC REV: CPT | Performed by: NURSE PRACTITIONER

## 2018-05-14 PROCEDURE — G8417 CALC BMI ABV UP PARAM F/U: HCPCS | Performed by: NURSE PRACTITIONER

## 2018-05-14 PROCEDURE — 4004F PT TOBACCO SCREEN RCVD TLK: CPT | Performed by: NURSE PRACTITIONER

## 2018-05-14 PROCEDURE — G8427 DOCREV CUR MEDS BY ELIG CLIN: HCPCS | Performed by: NURSE PRACTITIONER

## 2018-05-14 RX ORDER — DIAZEPAM 5 MG/1
5 TABLET ORAL EVERY 12 HOURS PRN
Qty: 60 TABLET | Refills: 0 | Status: SHIPPED | OUTPATIENT
Start: 2018-05-14 | End: 2018-06-14 | Stop reason: SDUPTHER

## 2018-05-14 RX ORDER — BUDESONIDE AND FORMOTEROL FUMARATE DIHYDRATE 160; 4.5 UG/1; UG/1
2 AEROSOL RESPIRATORY (INHALATION) 2 TIMES DAILY
COMMUNITY
End: 2020-03-03

## 2018-05-14 RX ORDER — CEFDINIR 300 MG/1
300 CAPSULE ORAL 2 TIMES DAILY
Qty: 20 CAPSULE | Refills: 0 | Status: SHIPPED | OUTPATIENT
Start: 2018-05-14 | End: 2018-05-24

## 2018-05-14 ASSESSMENT — ENCOUNTER SYMPTOMS
SHORTNESS OF BREATH: 0
ALLERGIC/IMMUNOLOGIC NEGATIVE: 1
SORE THROAT: 0
RHINORRHEA: 0
EYES NEGATIVE: 1
ABDOMINAL DISTENTION: 0
COUGH: 0
BACK PAIN: 1
CHEST TIGHTNESS: 0

## 2018-05-14 ASSESSMENT — PATIENT HEALTH QUESTIONNAIRE - PHQ9
2. FEELING DOWN, DEPRESSED OR HOPELESS: 2
SUM OF ALL RESPONSES TO PHQ QUESTIONS 1-9: 3
1. LITTLE INTEREST OR PLEASURE IN DOING THINGS: 1
SUM OF ALL RESPONSES TO PHQ9 QUESTIONS 1 & 2: 3

## 2018-06-14 ENCOUNTER — HOSPITAL ENCOUNTER (OUTPATIENT)
Dept: OTHER | Age: 53
Discharge: HOME | End: 2018-06-14
Attending: NURSE PRACTITIONER | Admitting: NURSE PRACTITIONER

## 2018-06-14 ENCOUNTER — OFFICE VISIT (OUTPATIENT)
Dept: FAMILY MEDICINE CLINIC | Age: 53
End: 2018-06-14
Payer: MEDICARE

## 2018-06-14 VITALS
RESPIRATION RATE: 18 BRPM | HEART RATE: 79 BPM | DIASTOLIC BLOOD PRESSURE: 74 MMHG | SYSTOLIC BLOOD PRESSURE: 122 MMHG | BODY MASS INDEX: 36 KG/M2 | OXYGEN SATURATION: 91 % | HEIGHT: 73 IN | WEIGHT: 271.6 LBS

## 2018-06-14 DIAGNOSIS — E78.2 MIXED HYPERLIPIDEMIA: ICD-10-CM

## 2018-06-14 DIAGNOSIS — I10 ESSENTIAL HYPERTENSION: ICD-10-CM

## 2018-06-14 DIAGNOSIS — F41.9 ANXIETY: Primary | ICD-10-CM

## 2018-06-14 PROCEDURE — 4004F PT TOBACCO SCREEN RCVD TLK: CPT | Performed by: NURSE PRACTITIONER

## 2018-06-14 PROCEDURE — 99213 OFFICE O/P EST LOW 20 MIN: CPT | Performed by: NURSE PRACTITIONER

## 2018-06-14 PROCEDURE — 3017F COLORECTAL CA SCREEN DOC REV: CPT | Performed by: NURSE PRACTITIONER

## 2018-06-14 PROCEDURE — G8417 CALC BMI ABV UP PARAM F/U: HCPCS | Performed by: NURSE PRACTITIONER

## 2018-06-14 PROCEDURE — G8427 DOCREV CUR MEDS BY ELIG CLIN: HCPCS | Performed by: NURSE PRACTITIONER

## 2018-06-14 RX ORDER — LISINOPRIL 5 MG/1
5 TABLET ORAL 2 TIMES DAILY
Qty: 60 TABLET | Refills: 3 | Status: SHIPPED | OUTPATIENT
Start: 2018-06-14 | End: 2018-12-27 | Stop reason: SDUPTHER

## 2018-06-14 RX ORDER — TESTOSTERONE CYPIONATE 200 MG/ML
INJECTION INTRAMUSCULAR
Refills: 5 | COMMUNITY
Start: 2018-06-05

## 2018-06-14 ASSESSMENT — ENCOUNTER SYMPTOMS
CHEST TIGHTNESS: 0
WHEEZING: 1
EYES NEGATIVE: 1
COUGH: 0
ABDOMINAL DISTENTION: 0
SHORTNESS OF BREATH: 0
ABDOMINAL PAIN: 0
BLURRED VISION: 0
ALLERGIC/IMMUNOLOGIC NEGATIVE: 1
BLOOD IN STOOL: 0

## 2018-06-15 RX ORDER — DIAZEPAM 5 MG/1
5 TABLET ORAL EVERY 12 HOURS PRN
Qty: 60 TABLET | Refills: 0 | Status: SHIPPED | OUTPATIENT
Start: 2018-06-15 | End: 2018-07-10 | Stop reason: SDUPTHER

## 2018-07-10 ENCOUNTER — OFFICE VISIT (OUTPATIENT)
Dept: FAMILY MEDICINE CLINIC | Age: 53
End: 2018-07-10
Payer: MEDICARE

## 2018-07-10 VITALS
BODY MASS INDEX: 36.47 KG/M2 | HEART RATE: 65 BPM | DIASTOLIC BLOOD PRESSURE: 68 MMHG | HEIGHT: 73 IN | WEIGHT: 275.2 LBS | SYSTOLIC BLOOD PRESSURE: 128 MMHG | RESPIRATION RATE: 18 BRPM | OXYGEN SATURATION: 94 %

## 2018-07-10 DIAGNOSIS — F41.9 ANXIETY: Primary | ICD-10-CM

## 2018-07-10 DIAGNOSIS — F41.9 ANXIETY: ICD-10-CM

## 2018-07-10 PROCEDURE — 4004F PT TOBACCO SCREEN RCVD TLK: CPT | Performed by: NURSE PRACTITIONER

## 2018-07-10 PROCEDURE — G8417 CALC BMI ABV UP PARAM F/U: HCPCS | Performed by: NURSE PRACTITIONER

## 2018-07-10 PROCEDURE — 3017F COLORECTAL CA SCREEN DOC REV: CPT | Performed by: NURSE PRACTITIONER

## 2018-07-10 PROCEDURE — 99213 OFFICE O/P EST LOW 20 MIN: CPT | Performed by: NURSE PRACTITIONER

## 2018-07-10 PROCEDURE — G8427 DOCREV CUR MEDS BY ELIG CLIN: HCPCS | Performed by: NURSE PRACTITIONER

## 2018-07-10 RX ORDER — DIAZEPAM 5 MG/1
5 TABLET ORAL EVERY 12 HOURS PRN
Qty: 60 TABLET | Refills: 0 | Status: SHIPPED | OUTPATIENT
Start: 2018-07-10 | End: 2018-08-23 | Stop reason: SDUPTHER

## 2018-07-10 NOTE — PROGRESS NOTES
SUBJECTIVE:    Cristina Bolivar is a 46 y.o. male . Chief Complaint   Patient presents with    Anxiety    Medication Refill        HPI:   Presents today for follow up for anxiety and refill of valium. He reports that he takes his valium as prescribed it is effective for relief of anxiety symptoms. He has not noticed any adverse effects. Additionally he is fasting for labs today. He reports he is doing well. His blood pressure had been under good control. He conts to follow with rheumatology for management of arthritis and pulmonology for lung disease. He reports both specialties are pleased with how he is doing. He denies any HTN associated symptoms. He reports significant improvement starting testosterone injection. Anxiety: Patient complains of evaluation of anxiety disorder. He has the following anxiety symptoms: anxious and nervousness. Onset of symptoms was approximately several years ago, stable since that time. He denies current suicidal and homicidal ideation. Family history significant for anxiety and depression. Possible organic causes contributing are: none. Risk factors: positive family history in  mother and negative life event loss of family members Previous treatment includes Valium and has attened comp care, but felt as if it didnt help. He complains of the following side effects from the treatment: none.         Allergies   Allergen Reactions    Dicyclomine Hcl     Pcn [Penicillins]     Tolectin [Tolmetin Sodium]       Past Medical History:   Diagnosis Date    Arthritis     Chronic back pain     r/t arthritis    Depression     Fibromyalgia     Reflux esophagitis     Spinal stenosis       Family History   Problem Relation Age of Onset    Arthritis Mother     Asthma Mother     Heart Disease Mother     High Blood Pressure Mother     Arthritis Father     Heart Disease Father     Depression Sister     Arthritis Sister       Social History     Social History   

## 2018-07-30 NOTE — PROGRESS NOTES
the pain was several months ago. Location is acromioclavicular joint. No history of dislocation. Symptoms are aggravated by reaching, lifting, twisting, repetitive use. Symptoms are diminished by  medication: Norco received from rheumatologist.   Limited activities include: reaching, lifting, pulling, pushing, carrying, twisting. mild stiffness, crepitus right AC region noted is reported. Patient is a retired. Previous films show mild AC joint arthritis. Allergies   Allergen Reactions    Dicyclomine Hcl     Pcn [Penicillins]     Tolectin [Tolmetin Sodium]       Past Medical History:   Diagnosis Date    Arthritis     Chronic back pain     r/t arthritis    Depression     Fibromyalgia     Reflux esophagitis     Spinal stenosis       Family History   Problem Relation Age of Onset    Arthritis Mother     Asthma Mother     Heart Disease Mother     High Blood Pressure Mother     Arthritis Father     Heart Disease Father     Depression Sister     Arthritis Sister       Social History     Social History    Marital status:      Spouse name: N/A    Number of children: N/A    Years of education: 7     Occupational History    Not on file. Social History Main Topics    Smoking status: Light Tobacco Smoker     Types: Cigarettes    Smokeless tobacco: Former User    Alcohol use No    Drug use: No    Sexual activity: Yes     Other Topics Concern    Not on file     Social History Narrative    No narrative on file        Review of Systems   Constitutional: Positive for fatigue. Negative for activity change, appetite change and unexpected weight change. HENT: Negative. Eyes: Negative. Respiratory: Negative for cough, chest tightness and shortness of breath. Cardiovascular: Negative for chest pain, palpitations and leg swelling. Gastrointestinal: Negative. Endocrine: Positive for cold intolerance. Genitourinary: Negative. Musculoskeletal: Positive for arthralgias. done

## 2018-08-23 ENCOUNTER — OFFICE VISIT (OUTPATIENT)
Dept: FAMILY MEDICINE CLINIC | Age: 53
End: 2018-08-23
Payer: MEDICARE

## 2018-08-23 VITALS
HEIGHT: 73 IN | RESPIRATION RATE: 18 BRPM | BODY MASS INDEX: 36.58 KG/M2 | OXYGEN SATURATION: 96 % | HEART RATE: 81 BPM | DIASTOLIC BLOOD PRESSURE: 73 MMHG | WEIGHT: 276 LBS | SYSTOLIC BLOOD PRESSURE: 129 MMHG

## 2018-08-23 DIAGNOSIS — I10 ESSENTIAL HYPERTENSION: ICD-10-CM

## 2018-08-23 DIAGNOSIS — F41.9 ANXIETY: Primary | ICD-10-CM

## 2018-08-23 PROCEDURE — G8427 DOCREV CUR MEDS BY ELIG CLIN: HCPCS | Performed by: NURSE PRACTITIONER

## 2018-08-23 PROCEDURE — 4004F PT TOBACCO SCREEN RCVD TLK: CPT | Performed by: NURSE PRACTITIONER

## 2018-08-23 PROCEDURE — G8417 CALC BMI ABV UP PARAM F/U: HCPCS | Performed by: NURSE PRACTITIONER

## 2018-08-23 PROCEDURE — 99213 OFFICE O/P EST LOW 20 MIN: CPT | Performed by: NURSE PRACTITIONER

## 2018-08-23 PROCEDURE — 3017F COLORECTAL CA SCREEN DOC REV: CPT | Performed by: NURSE PRACTITIONER

## 2018-08-23 RX ORDER — TRIAMTERENE AND HYDROCHLOROTHIAZIDE 37.5; 25 MG/1; MG/1
1 TABLET ORAL DAILY
Qty: 30 TABLET | Refills: 11 | Status: SHIPPED | OUTPATIENT
Start: 2018-08-23 | End: 2019-08-19 | Stop reason: SDUPTHER

## 2018-08-23 RX ORDER — DIAZEPAM 5 MG/1
5 TABLET ORAL EVERY 12 HOURS PRN
Qty: 60 TABLET | Refills: 0 | Status: SHIPPED | OUTPATIENT
Start: 2018-08-23 | End: 2018-09-20 | Stop reason: SDUPTHER

## 2018-08-23 ASSESSMENT — ENCOUNTER SYMPTOMS
SHORTNESS OF BREATH: 0
GASTROINTESTINAL NEGATIVE: 1
BACK PAIN: 1
ALLERGIC/IMMUNOLOGIC NEGATIVE: 1

## 2018-08-23 NOTE — PROGRESS NOTES
SUBJECTIVE:    Nicola Mayer is a 46 y.o. male . Chief Complaint   Patient presents with    Anxiety    Medication Refill        HPI:   Presents today for follow up for anxiety and refill of valium. He reports that he takes his valium as prescribed it is effective for relief of anxiety symptoms. He has not noticed any adverse effects. He reports he is doing well. His blood pressure had been under good control. He conts to follow with rheumatology for management of arthritis and pulmonology for lung disease. He reports both specialties are pleased with how he is doing. He denies any HTN associated symptoms. He reports significant improvement after starting testosterone injection. Anxiety: Patient complains of evaluation of anxiety disorder. He has the following anxiety symptoms: anxious and nervousness. Onset of symptoms was approximately several years ago, stable since that time. He denies current suicidal and homicidal ideation. Family history significant for anxiety and depression. Possible organic causes contributing are: none. Risk factors: positive family history in  mother and negative life event loss of family members Previous treatment includes Valium and has attened comp care, but felt as if it didnt help. He complains of the following side effects from the treatment: none. .      Hypertension   This is a chronic problem. The current episode started more than 1 year ago. The problem has been gradually improving since onset. The problem is controlled. Associated symptoms include anxiety. Pertinent negatives include no chest pain, palpitations or shortness of breath.         Allergies   Allergen Reactions    Dicyclomine Hcl     Pcn [Penicillins]     Tolectin [Tolmetin Sodium]       Past Medical History:   Diagnosis Date    Arthritis     Chronic back pain     r/t arthritis    Depression     Fibromyalgia     Reflux esophagitis     Spinal stenosis       Family History   Problem Relation Age of Onset    Arthritis Mother     Asthma Mother     Heart Disease Mother     High Blood Pressure Mother     Arthritis Father     Heart Disease Father     Depression Sister     Arthritis Sister       Social History     Social History    Marital status:      Spouse name: N/A    Number of children: N/A    Years of education: 7     Occupational History    Not on file. Social History Main Topics    Smoking status: Light Tobacco Smoker     Types: Cigarettes    Smokeless tobacco: Former User    Alcohol use No    Drug use: No    Sexual activity: Yes     Other Topics Concern    Not on file     Social History Narrative    No narrative on file        Review of Systems   Constitutional: Positive for activity change ( improving) and fatigue ( improving). HENT: Negative. Respiratory: Negative for shortness of breath. Cardiovascular: Negative for chest pain, palpitations and leg swelling. Gastrointestinal: Negative. Endocrine: Negative. Genitourinary: Negative. Musculoskeletal: Positive for arthralgias ( follow by pain management) and back pain. Skin: Negative. Allergic/Immunologic: Negative. Neurological: Negative. Hematological: Negative. Psychiatric/Behavioral: The patient is nervous/anxious. OBJECTIVE:    /73   Pulse 81   Resp 18   Ht 6' 1\" (1.854 m)   Wt 276 lb (125.2 kg)   SpO2 96%   BMI 36.41 kg/m²   BP Readings from Last 3 Encounters:   08/23/18 129/73   07/10/18 128/68   06/14/18 122/74     Body mass index is 36.41 kg/m². Physical Exam   Constitutional: He is oriented to person, place, and time. He appears well-developed and well-nourished. HENT:   Head: Normocephalic and atraumatic. Eyes: Pupils are equal, round, and reactive to light. Conjunctivae and EOM are normal. Right eye exhibits no discharge. Left eye exhibits no discharge. No scleral icterus. Neck: Normal range of motion. Neck supple. No JVD present.  No tracheal deviation present. No thyromegaly present. Cardiovascular: Normal rate, normal heart sounds and intact distal pulses. Pulmonary/Chest: No respiratory distress. He has no wheezes. Abdominal: Soft. Bowel sounds are normal. He exhibits no distension and no mass. There is no tenderness. Musculoskeletal: Normal range of motion. Arthritis and musculosckeletal issues   Lymphadenopathy:     He has no cervical adenopathy. Neurological: He is alert and oriented to person, place, and time. He has normal reflexes. No cranial nerve deficit. Coordination normal.   Skin: Skin is warm and dry. No rash noted. Psychiatric: He has a normal mood and affect. His behavior is normal. Judgment and thought content normal.   Nursing note and vitals reviewed. No results found for requested labs within last 30 days. LDL Calculated (mg/dL)   Date Value   08/21/2015 78         Lab Results   Component Value Date    WBC 12.2 09/07/2016    NEUTROABS 8.4 09/07/2016    HGB 13.5 09/07/2016    HCT 40.0 09/07/2016    MCV 88.7 09/07/2016     09/07/2016     Controlled. Lab Results   Component Value Date    TSH 1.61 08/26/2013       ASSESSMENT / PLAN  Nixon Trevino was seen today for anxiety and medication refill. Diagnoses and all orders for this visit:    Anxiety  Stable with current medication. -     diazepam (VALIUM) 5 MG tablet; Take 1 tablet by mouth every 12 hours as needed for Anxiety or Sleep for up to 30 days. .    Essential hypertension  appears well controlled  -     triamterene-hydrochlorothiazide (MAXZIDE-25) 37.5-25 MG per tablet; Take 1 tablet by mouth daily        Medications Discontinued During This Encounter   Medication Reason    triamterene-hydrochlorothiazide (MAXZIDE-25) 37.5-25 MG per tablet REORDER    diazepam (VALIUM) 5 MG tablet REORDER     Controlled Substances Monitoring:     RX Monitoring 8/23/2018   Attestation The Prescription Monitoring Report for this patient was reviewed today. Documentation Possible medication side effects, risk of tolerance/dependence & alternative treatments discussed. ;No signs of potential drug abuse or diversion identified. Chronic Pain Treatment objectives documented - patient is progressing appropriately. ;Dose reduction has been attempted. Medication Contracts Existing medication contract. No Follow-up on file.

## 2018-08-23 NOTE — PATIENT INSTRUCTIONS
Education and Discharge Instructions:  Keep a list of your medicines with you at all times. Always bring a up to date list or the medication bottles when going to the doctor or hospital.   Always follow the directions on your medications unless the doctor or nurse has instructed you otherwise. Keep all medications out of reach of children. Store medicines according to the directions on the label. Seek emergency medical attention if you think you have used too much medication. A overdose can be fatal.  Don't share your medicines with anyone. It may harm them. Discard any unused or out dated medications. If you have any questions, ask your provider or pharmacist for more information. Be sure to keep all appointments for provider visits or tests. Please continue all your medications that the Provider has prescribed for you unless other [facility]    1. Mental Health Info and Treatment [phone]  2. Drug and Alcohol Detox Rehab treatment 24 hr [phone]  3. Stop Smoking Classes- [facility] [id number]-[phone]  4. [address] Program- prescription [phone]  5. Hospice Care [phone]  6. Adult/Child Protection [phone]    [facility]: Your online connection to your health information. Download the [employer] at [employer] ([employer]) or the [employer]	[place] ([employer] [address] from the list of providers. [facility] Help Desk: 5-630-16-BWYCO    [place]        We are committed to providing you with the best care possible. In order to help us achieve these goals please remember to bring all medications, herbal products, and over the counter supplements with you to each visit. If your provider has ordered testing for you, please be sure to follow up with our office if you have not received results within 7 days after the testing took place.      *If you receive a survey after visiting one of our offices, please take time to share your experience concerning your physician office visit. These surveys are confidential and no health information about you is shared.   We are eager to improve for you and we are counting on your feedback to help make that happen

## 2018-09-20 ENCOUNTER — OFFICE VISIT (OUTPATIENT)
Dept: FAMILY MEDICINE CLINIC | Age: 53
End: 2018-09-20
Payer: MEDICARE

## 2018-09-20 VITALS
RESPIRATION RATE: 18 BRPM | HEIGHT: 73 IN | HEART RATE: 87 BPM | OXYGEN SATURATION: 93 % | BODY MASS INDEX: 35.94 KG/M2 | WEIGHT: 271.2 LBS | DIASTOLIC BLOOD PRESSURE: 73 MMHG | SYSTOLIC BLOOD PRESSURE: 119 MMHG

## 2018-09-20 DIAGNOSIS — F41.9 ANXIETY: ICD-10-CM

## 2018-09-20 DIAGNOSIS — J20.9 ACUTE BRONCHITIS, UNSPECIFIED ORGANISM: Primary | ICD-10-CM

## 2018-09-20 PROCEDURE — 96372 THER/PROPH/DIAG INJ SC/IM: CPT | Performed by: NURSE PRACTITIONER

## 2018-09-20 PROCEDURE — 99214 OFFICE O/P EST MOD 30 MIN: CPT | Performed by: NURSE PRACTITIONER

## 2018-09-20 RX ORDER — AZITHROMYCIN 250 MG/1
TABLET, FILM COATED ORAL
Qty: 1 PACKET | Refills: 0 | Status: SHIPPED | OUTPATIENT
Start: 2018-09-20 | End: 2019-10-16 | Stop reason: SDUPTHER

## 2018-09-20 RX ORDER — METHYLPREDNISOLONE SODIUM SUCCINATE 125 MG/2ML
125 INJECTION, POWDER, LYOPHILIZED, FOR SOLUTION INTRAMUSCULAR; INTRAVENOUS ONCE
Status: COMPLETED | OUTPATIENT
Start: 2018-09-20 | End: 2018-09-20

## 2018-09-20 RX ORDER — DIAZEPAM 5 MG/1
5 TABLET ORAL EVERY 12 HOURS PRN
Qty: 60 TABLET | Refills: 0 | Status: SHIPPED | OUTPATIENT
Start: 2018-09-20 | End: 2018-10-18 | Stop reason: SDUPTHER

## 2018-09-20 RX ADMIN — METHYLPREDNISOLONE SODIUM SUCCINATE 125 MG: 125 INJECTION, POWDER, LYOPHILIZED, FOR SOLUTION INTRAMUSCULAR; INTRAVENOUS at 10:34

## 2018-09-20 ASSESSMENT — ENCOUNTER SYMPTOMS
CHOKING: 0
EYE ITCHING: 0
SINUS PAIN: 1
VOMITING: 0
SHORTNESS OF BREATH: 0
NAUSEA: 0
DIARRHEA: 0
SINUS PRESSURE: 1
SINUS COMPLAINT: 1
EYE REDNESS: 0
TROUBLE SWALLOWING: 0
COUGH: 1
SORE THROAT: 0
CHEST TIGHTNESS: 0

## 2018-09-20 NOTE — PROGRESS NOTES
(SYMBICORT) 160-4.5 MCG/ACT AERO Inhale 2 puffs into the lungs 2 times daily      VENTOLIN  (90 Base) MCG/ACT inhaler   6    ASPIR-LOW 81 MG EC tablet Take 1 tablet by mouth daily  11    HYDROcodone-acetaminophen (NORCO)  MG per tablet Take 1 tablet by mouth 3 times daily as needed. Lovetta Blaze 0    ipratropium (ATROVENT) 0.03 % nasal spray 1 spray by Nasal route 2 times daily  3    omeprazole (PRILOSEC) 40 MG delayed release capsule Take 1 capsule by mouth daily  6    montelukast (SINGULAIR) 10 MG tablet Take 10 mg by mouth nightly      desloratadine (CLARINEX) 5 MG tablet Take 1 tablet by mouth daily 90 tablet 1    gemfibrozil (LOPID) 600 MG tablet Take 1 tablet by mouth 2 times daily (before meals) Take before the 2 largest meals of the day. 60 tablet 3    fluticasone (FLONASE) 50 MCG/ACT nasal spray 1 spray by Nasal route daily 1 Bottle 3     No current facility-administered medications on file prior to visit. Review of Systems   Constitutional: Negative for activity change, fatigue and fever. HENT: Positive for congestion, ear pain, sinus pain and sinus pressure. Negative for sore throat and trouble swallowing. Eyes: Negative for redness and itching. Respiratory: Positive for cough. Negative for choking, chest tightness and shortness of breath. Cardiovascular: Negative for chest pain and palpitations. Gastrointestinal: Negative for diarrhea, nausea and vomiting. Endocrine: Negative for cold intolerance and heat intolerance. Genitourinary: Negative for dysuria, flank pain and frequency. Musculoskeletal: Negative for joint swelling, neck pain and neck stiffness. Allergic/Immunologic: Positive for environmental allergies. Neurological: Positive for dizziness (secondary to right ear feeling full of fluid) and headaches. Light-headedness: Patient reports sinus headache frontal.   Psychiatric/Behavioral: Negative for behavioral problems and self-injury.  The patient is tolerate to  help reduce his anxiety along with the prescription of diazepam 5 mg. Medications Discontinued During This Encounter   Medication Reason    diazepam (VALIUM) 5 MG tablet REORDER       Return in about 1 month (around 10/20/2018) for medication refill, medication follow-up, anxiety.   Vignesh Simon, APRN - CNP

## 2018-09-20 NOTE — PATIENT INSTRUCTIONS
Education and Discharge Instructions:  Keep a list of your medicines with you at all times. Always bring a up to date list or the medication bottles when going to the doctor or hospital.   Always follow the directions on your medications unless the doctor or nurse has instructed you otherwise. Keep all medications out of reach of children. Store medicines according to the directions on the label. Seek emergency medical attention if you think you have used too much medication. A overdose can be fatal.  Don't share your medicines with anyone. It may harm them. Discard any unused or out dated medications. If you have any questions, ask your provider or pharmacist for more information. Be sure to keep all appointments for provider visits or tests. Please continue all your medications that the Provider has prescribed for you unless other [facility]    1. Mental Health Info and Treatment [phone]  2. Drug and Alcohol Detox Rehab treatment 24 hr [phone]  3. Stop Smoking Classes- [facility] [place]-[phone]  4. [address] Program- prescription [phone]  5. Hospice Care [phone]  6. Adult/Child Protection [phone]    [employer]: Your online connection to your health information. Download the [employer] at [employer] ([employer]) or the [employer]	[place] ([employer]). [address] from the list of providers. [employer] Help Desk: 4-215-96-SWQSW    [employer]        We are committed to providing you with the best care possible. In order to help us achieve these goals please remember to bring all medications, herbal products, and over the counter supplements with you to each visit. If your provider has ordered testing for you, please be sure to follow up with our office if you have not received results within 7 days after the testing took place.      *If you receive a survey after visiting one of our offices, please take time to share your experience concerning your physician office visit. These surveys are confidential and no health information about you is shared. We are eager to improve for you and we are counting on your feedback to help make that happen  Patient Education   Patient Education        Anxiety Disorder: Care Instructions  Your Care Instructions    Anxiety is a normal reaction to stress. Difficult situations can cause you to have symptoms such as sweaty palms and a nervous feeling. In an anxiety disorder, the symptoms are far more severe. Constant worry, muscle tension, trouble sleeping, nausea and diarrhea, and other symptoms can make normal daily activities difficult or impossible. These symptoms may occur for no reason, and they can affect your work, school, or social life. Medicines, counseling, and self-care can all help. Follow-up care is a key part of your treatment and safety. Be sure to make and go to all appointments, and call your doctor if you are having problems. It's also a good idea to know your test results and keep a list of the medicines you take. How can you care for yourself at home? · Take medicines exactly as directed. Call your doctor if you think you are having a problem with your medicine. · Go to your counseling sessions and follow-up appointments. · Recognize and accept your anxiety. Then, when you are in a situation that makes you anxious, say to yourself, \"This is not an emergency. I feel uncomfortable, but I am not in danger. I can keep going even if I feel anxious. \"  · Be kind to your body:  ¨ Relieve tension with exercise or a massage. ¨ Get enough rest.  ¨ Avoid alcohol, caffeine, nicotine, and illegal drugs. They can increase your anxiety level and cause sleep problems. ¨ Learn and do relaxation techniques. See below for more about these techniques. · Engage your mind. Get out and do something you enjoy.  Go to a funny movie,

## 2018-09-20 NOTE — PROGRESS NOTES
Have you seen any other physician or provider since your last visit no    Have you had any other diagnostic tests since your last visit? no    Have you changed or stopped any medications since your last visit? no     Goals      Blood Pressure < 140/90

## 2018-10-18 ENCOUNTER — OFFICE VISIT (OUTPATIENT)
Dept: FAMILY MEDICINE CLINIC | Age: 53
End: 2018-10-18
Payer: MEDICARE

## 2018-10-18 VITALS
RESPIRATION RATE: 18 BRPM | HEART RATE: 73 BPM | WEIGHT: 273 LBS | OXYGEN SATURATION: 97 % | SYSTOLIC BLOOD PRESSURE: 135 MMHG | BODY MASS INDEX: 36.18 KG/M2 | HEIGHT: 73 IN | DIASTOLIC BLOOD PRESSURE: 88 MMHG

## 2018-10-18 DIAGNOSIS — R05.9 COUGH: ICD-10-CM

## 2018-10-18 DIAGNOSIS — R79.89 LOW TESTOSTERONE IN MALE: Primary | ICD-10-CM

## 2018-10-18 DIAGNOSIS — F41.9 ANXIETY: ICD-10-CM

## 2018-10-18 PROCEDURE — 99214 OFFICE O/P EST MOD 30 MIN: CPT | Performed by: NURSE PRACTITIONER

## 2018-10-18 PROCEDURE — 96372 THER/PROPH/DIAG INJ SC/IM: CPT | Performed by: NURSE PRACTITIONER

## 2018-10-18 RX ORDER — DIAZEPAM 5 MG/1
5 TABLET ORAL EVERY 12 HOURS PRN
Qty: 60 TABLET | Refills: 0 | Status: SHIPPED | OUTPATIENT
Start: 2018-10-18 | End: 2018-11-26 | Stop reason: SDUPTHER

## 2018-10-18 RX ORDER — HYDROCODONE POLISTIREX AND CHLORPHENIRAMINE POLISTIREX 10; 8 MG/5ML; MG/5ML
5 SUSPENSION, EXTENDED RELEASE ORAL EVERY 12 HOURS PRN
Qty: 120 ML | Refills: 0 | Status: SHIPPED | OUTPATIENT
Start: 2018-10-18 | End: 2019-10-16 | Stop reason: SDUPTHER

## 2018-10-18 RX ORDER — TESTOSTERONE CYPIONATE 200 MG/ML
200 INJECTION INTRAMUSCULAR ONCE
Status: COMPLETED | OUTPATIENT
Start: 2018-10-18 | End: 2018-10-18

## 2018-10-18 RX ADMIN — TESTOSTERONE CYPIONATE 200 MG: 200 INJECTION INTRAMUSCULAR at 09:06

## 2018-10-18 ASSESSMENT — ENCOUNTER SYMPTOMS
EYE REDNESS: 0
NAUSEA: 0
DIARRHEA: 0
ABDOMINAL PAIN: 0
EYE PAIN: 0
TROUBLE SWALLOWING: 0
SHORTNESS OF BREATH: 0
SORE THROAT: 0
VOMITING: 0
COUGH: 1
CHEST TIGHTNESS: 0

## 2018-10-18 NOTE — PROGRESS NOTES
Have you seen any other physician or provider since your last visit no    Have you had any other diagnostic tests since your last visit? no    Have you changed or stopped any medications since your last visit? no         Goals      Blood Pressure < 140/90
ASPIR-LOW 81 MG EC tablet Take 1 tablet by mouth daily  11    HYDROcodone-acetaminophen (NORCO)  MG per tablet Take 1 tablet by mouth 3 times daily as needed. Neville Matt 0    ipratropium (ATROVENT) 0.03 % nasal spray 1 spray by Nasal route 2 times daily  3    omeprazole (PRILOSEC) 40 MG delayed release capsule Take 1 capsule by mouth daily  6    montelukast (SINGULAIR) 10 MG tablet Take 10 mg by mouth nightly      gemfibrozil (LOPID) 600 MG tablet Take 1 tablet by mouth 2 times daily (before meals) Take before the 2 largest meals of the day. 60 tablet 3    fluticasone (FLONASE) 50 MCG/ACT nasal spray 1 spray by Nasal route daily 1 Bottle 3     No current facility-administered medications on file prior to visit. Review of Systems   Constitutional: Negative for chills and fever. HENT: Negative for congestion, ear pain, nosebleeds, sore throat and trouble swallowing. Eyes: Negative for pain and redness. Respiratory: Positive for cough (chronic nonproductive). Negative for chest tightness and shortness of breath. Cardiovascular: Negative for chest pain, palpitations and leg swelling. Gastrointestinal: Negative for abdominal pain, diarrhea, nausea and vomiting. Genitourinary: Negative for difficulty urinating, dysuria and flank pain. Musculoskeletal: Negative for arthralgias and gait problem. Skin: Negative for pallor and rash. Neurological: Negative for dizziness, numbness and headaches. Psychiatric/Behavioral: Positive for sleep disturbance (due to night time cough). Negative for agitation (stable with medications). The patient is not nervous/anxious (stable with medications). OBJECTIVE:  /88   Pulse 73   Resp 18   Ht 6' 1\" (1.854 m)   Wt 273 lb (123.8 kg)   SpO2 97%   BMI 36.02 kg/m²       Physical Exam   Constitutional: He is oriented to person, place, and time. Vital signs are normal. He appears well-developed and well-nourished. He is active. No distress.    HENT:

## 2018-11-26 ENCOUNTER — OFFICE VISIT (OUTPATIENT)
Dept: FAMILY MEDICINE CLINIC | Age: 53
End: 2018-11-26
Payer: MEDICARE

## 2018-11-26 VITALS
RESPIRATION RATE: 18 BRPM | BODY MASS INDEX: 35.92 KG/M2 | OXYGEN SATURATION: 93 % | WEIGHT: 271 LBS | DIASTOLIC BLOOD PRESSURE: 75 MMHG | HEIGHT: 73 IN | SYSTOLIC BLOOD PRESSURE: 139 MMHG | HEART RATE: 79 BPM

## 2018-11-26 DIAGNOSIS — F41.9 ANXIETY: Primary | ICD-10-CM

## 2018-11-26 DIAGNOSIS — R79.89 LOW TESTOSTERONE IN MALE: ICD-10-CM

## 2018-11-26 PROCEDURE — 99214 OFFICE O/P EST MOD 30 MIN: CPT | Performed by: NURSE PRACTITIONER

## 2018-11-26 RX ORDER — DIAZEPAM 5 MG/1
5 TABLET ORAL EVERY 12 HOURS PRN
Qty: 60 TABLET | Refills: 0 | Status: SHIPPED | OUTPATIENT
Start: 2018-11-26 | End: 2018-12-26 | Stop reason: SDUPTHER

## 2018-11-26 ASSESSMENT — ENCOUNTER SYMPTOMS
SORE THROAT: 0
EYE PAIN: 0
TROUBLE SWALLOWING: 0
SHORTNESS OF BREATH: 0
NAUSEA: 0
DIARRHEA: 0
CHEST TIGHTNESS: 0
EYE REDNESS: 0
ABDOMINAL PAIN: 0
COUGH: 0
VOMITING: 0

## 2018-11-26 NOTE — PROGRESS NOTES
Have you seen any other physician or provider since your last visit no    Have you had any other diagnostic tests since your last visit? no    Have you changed or stopped any medications since your last visit? no     Goals      Blood Pressure < 140/90
Judgment and thought content normal. Cognition and memory are normal.   Stable with current medication. ASSESSMENT/PLAN:     John Modi was seen today for anxiety and medication refill. Diagnoses and all orders for this visit:    Anxiety  -     diazepam (VALIUM) 5 MG tablet; Take 1 tablet by mouth every 12 hours as needed for Anxiety or Sleep for up to 30 days. Stable with current dose. Patient denies any adverse reactions to this medications. Patient is talkative, appears well. Low testosterone in male        - Patient reports he is following up with Endocrinologist this Wednesday, 11/28/18 for follow-up and labs. Patient continues to inject supplemental depotestosterone per MD order [id number]. Patient denies any adverse side effects. Controlled Substances Monitoring:     RX Monitoring 11/26/2018   Attestation The Prescription Monitoring Report for this patient was reviewed today. Documentation Possible medication side effects, risk of tolerance/dependence & alternative treatments discussed. ;No signs of potential drug abuse or diversion identified. Acute Pain Prescriptions -   Chronic Pain -   Medication Contracts Existing medication contract. Medications Discontinued During This Encounter   Medication Reason    diazepam (VALIUM) 5 MG tablet REORDER       Return in about 1 month (around 12/26/2018).   José Luis Shay, APRN - CNP

## 2018-11-26 NOTE — PATIENT INSTRUCTIONS
after visiting one of our offices, please take time to share your experience concerning your physician office visit. These surveys are confidential and no health information about you is shared. We are eager to improve for you and we are counting on your feedback to help make that happen  Patient Education        Anxiety Disorder: Care Instructions  Your Care Instructions    Anxiety is a normal reaction to stress. Difficult situations can cause you to have symptoms such as sweaty palms and a nervous feeling. In an anxiety disorder, the symptoms are far more severe. Constant worry, muscle tension, trouble sleeping, nausea and diarrhea, and other symptoms can make normal daily activities difficult or impossible. These symptoms may occur for no reason, and they can affect your work, school, or social life. Medicines, counseling, and self-care can all help. Follow-up care is a key part of your treatment and safety. Be sure to make and go to all appointments, and call your doctor if you are having problems. It's also a good idea to know your test results and keep a list of the medicines you take. How can you care for yourself at home? · Take medicines exactly as directed. Call your doctor if you think you are having a problem with your medicine. · Go to your counseling sessions and follow-up appointments. · Recognize and accept your anxiety. Then, when you are in a situation that makes you anxious, say to yourself, \"This is not an emergency. I feel uncomfortable, but I am not in danger. I can keep going even if I feel anxious. \"  · Be kind to your body:  ? Relieve tension with exercise or a massage. ? Get enough rest.  ? Avoid alcohol, caffeine, nicotine, and illegal drugs. They can increase your anxiety level and cause sleep problems. ? Learn and do relaxation techniques. See below for more about these techniques. · Engage your mind. Get out and do something you enjoy.  Go to a funny movie, or take a walk or hike. Plan your day. Having too much or too little to do can make you anxious. · Keep a record of your symptoms. Discuss your fears with a good friend or family member, or join a support group for people with similar problems. Talking to others sometimes relieves stress. · Get involved in social groups, or volunteer to help others. Being alone sometimes makes things seem worse than they are. · Get at least 30 minutes of exercise on most days of the week to relieve stress. Walking is a good choice. You also may want to do other activities, such as running, swimming, cycling, or playing tennis or team sports. Relaxation techniques  Do relaxation exercises 10 to 20 minutes a day. You can play soothing, relaxing music while you do them, if you wish. · Tell others in your house that you are going to do your relaxation exercises. Ask them not to disturb you. · Find a comfortable place, away from all distractions and noise. · Lie down on your back, or sit with your back straight. · Focus on your breathing. Make it slow and steady. · Breathe in through your nose. Breathe out through either your nose or mouth. · Breathe deeply, filling up the area between your navel and your rib cage. Breathe so that your belly goes up and down. · Do not hold your breath. · Breathe like this for 5 to 10 minutes. Notice the feeling of calmness throughout your whole body. As you continue to breathe slowly and deeply, relax by doing the following for another 5 to 10 minutes:  · Tighten and relax each muscle group in your body. You can begin at your toes and work your way up to your head. · Imagine your muscle groups relaxing and becoming heavy. · Empty your mind of all thoughts. · Let yourself relax more and more deeply. · Become aware of the state of calmness that surrounds you.   · When your relaxation time is over, you can bring yourself back to alertness by moving your fingers and toes and then your hands and feet and then

## 2018-12-05 ENCOUNTER — HOSPITAL ENCOUNTER (OUTPATIENT)
Facility: HOSPITAL | Age: 53
Discharge: HOME | End: 2018-12-05
Payer: MEDICARE

## 2018-12-05 PROCEDURE — 36415 COLL VENOUS BLD VENIPUNCTURE: CPT

## 2018-12-26 ENCOUNTER — HOSPITAL ENCOUNTER (OUTPATIENT)
Facility: HOSPITAL | Age: 53
Discharge: HOME | End: 2018-12-26
Payer: MEDICARE

## 2018-12-26 ENCOUNTER — OFFICE VISIT (OUTPATIENT)
Dept: FAMILY MEDICINE CLINIC | Age: 53
End: 2018-12-26
Payer: MEDICARE

## 2018-12-26 VITALS
BODY MASS INDEX: 35.12 KG/M2 | WEIGHT: 265 LBS | RESPIRATION RATE: 18 BRPM | DIASTOLIC BLOOD PRESSURE: 79 MMHG | SYSTOLIC BLOOD PRESSURE: 129 MMHG | HEART RATE: 80 BPM | HEIGHT: 73 IN | OXYGEN SATURATION: 95 %

## 2018-12-26 DIAGNOSIS — Z13.1 SCREENING FOR DIABETES MELLITUS (DM): ICD-10-CM

## 2018-12-26 DIAGNOSIS — R53.83 OTHER FATIGUE: ICD-10-CM

## 2018-12-26 DIAGNOSIS — H92.01 EARACHE, RIGHT: Primary | ICD-10-CM

## 2018-12-26 DIAGNOSIS — F41.9 ANXIETY: ICD-10-CM

## 2018-12-26 DIAGNOSIS — F17.210 CIGARETTE SMOKER: ICD-10-CM

## 2018-12-26 DIAGNOSIS — E78.2 MIXED HYPERLIPIDEMIA: ICD-10-CM

## 2018-12-26 DIAGNOSIS — I10 ESSENTIAL HYPERTENSION: ICD-10-CM

## 2018-12-26 DIAGNOSIS — M89.9 DISORDER OF BONE: ICD-10-CM

## 2018-12-26 DIAGNOSIS — Z86.39 PERSONAL HISTORY OF OTHER ENDOCRINE, NUTRITIONAL AND METABOLIC DISEASE: ICD-10-CM

## 2018-12-26 LAB — HBA1C MFR BLD: 5.8 %

## 2018-12-26 PROCEDURE — 4004F PT TOBACCO SCREEN RCVD TLK: CPT | Performed by: NURSE PRACTITIONER

## 2018-12-26 PROCEDURE — G8427 DOCREV CUR MEDS BY ELIG CLIN: HCPCS | Performed by: NURSE PRACTITIONER

## 2018-12-26 PROCEDURE — G8417 CALC BMI ABV UP PARAM F/U: HCPCS | Performed by: NURSE PRACTITIONER

## 2018-12-26 PROCEDURE — 3017F COLORECTAL CA SCREEN DOC REV: CPT | Performed by: NURSE PRACTITIONER

## 2018-12-26 PROCEDURE — 83036 HEMOGLOBIN GLYCOSYLATED A1C: CPT | Performed by: NURSE PRACTITIONER

## 2018-12-26 PROCEDURE — 99214 OFFICE O/P EST MOD 30 MIN: CPT | Performed by: NURSE PRACTITIONER

## 2018-12-26 PROCEDURE — 96372 THER/PROPH/DIAG INJ SC/IM: CPT | Performed by: NURSE PRACTITIONER

## 2018-12-26 PROCEDURE — 36415 COLL VENOUS BLD VENIPUNCTURE: CPT

## 2018-12-26 PROCEDURE — G8484 FLU IMMUNIZE NO ADMIN: HCPCS | Performed by: NURSE PRACTITIONER

## 2018-12-26 RX ORDER — METHYLPREDNISOLONE ACETATE 80 MG/ML
80 INJECTION, SUSPENSION INTRA-ARTICULAR; INTRALESIONAL; INTRAMUSCULAR; SOFT TISSUE ONCE
Status: COMPLETED | OUTPATIENT
Start: 2018-12-26 | End: 2018-12-26

## 2018-12-26 RX ORDER — DIAZEPAM 5 MG/1
5 TABLET ORAL EVERY 12 HOURS PRN
Qty: 60 TABLET | Refills: 0
Start: 2018-12-26 | End: 2018-12-26 | Stop reason: SDUPTHER

## 2018-12-26 RX ORDER — DIAZEPAM 5 MG/1
5 TABLET ORAL EVERY 12 HOURS PRN
Qty: 60 TABLET | Refills: 0 | Status: SHIPPED | OUTPATIENT
Start: 2018-12-26 | End: 2019-01-24 | Stop reason: SDUPTHER

## 2018-12-26 RX ADMIN — METHYLPREDNISOLONE ACETATE 80 MG: 80 INJECTION, SUSPENSION INTRA-ARTICULAR; INTRALESIONAL; INTRAMUSCULAR; SOFT TISSUE at 09:18

## 2018-12-26 ASSESSMENT — ENCOUNTER SYMPTOMS
SHORTNESS OF BREATH: 0
BLURRED VISION: 0
ORTHOPNEA: 0
TROUBLE SWALLOWING: 0
ABDOMINAL PAIN: 0
EYE REDNESS: 0
VOMITING: 0
RHINORRHEA: 1
NAUSEA: 0
COUGH: 1
DIARRHEA: 0
CHEST TIGHTNESS: 0
EYE PAIN: 0
SORE THROAT: 0

## 2018-12-26 NOTE — PATIENT INSTRUCTIONS
stress. · Get involved in social groups, or volunteer to help others. Being alone sometimes makes things seem worse than they are. · Get at least 30 minutes of exercise on most days of the week to relieve stress. Walking is a good choice. You also may want to do other activities, such as running, swimming, cycling, or playing tennis or team sports. Relaxation techniques  Do relaxation exercises 10 to 20 minutes a day. You can play soothing, relaxing music while you do them, if you wish. · Tell others in your house that you are going to do your relaxation exercises. Ask them not to disturb you. · Find a comfortable place, away from all distractions and noise. · Lie down on your back, or sit with your back straight. · Focus on your breathing. Make it slow and steady. · Breathe in through your nose. Breathe out through either your nose or mouth. · Breathe deeply, filling up the area between your navel and your rib cage. Breathe so that your belly goes up and down. · Do not hold your breath. · Breathe like this for 5 to 10 minutes. Notice the feeling of calmness throughout your whole body. As you continue to breathe slowly and deeply, relax by doing the following for another 5 to 10 minutes:  · Tighten and relax each muscle group in your body. You can begin at your toes and work your way up to your head. · Imagine your muscle groups relaxing and becoming heavy. · Empty your mind of all thoughts. · Let yourself relax more and more deeply. · Become aware of the state of calmness that surrounds you. · When your relaxation time is over, you can bring yourself back to alertness by moving your fingers and toes and then your hands and feet and then stretching and moving your entire body. Sometimes people fall asleep during relaxation, but they usually wake up shortly afterward.   · Always give yourself time to return to full alertness before you drive a car or do anything that might cause an accident if you are

## 2018-12-27 DIAGNOSIS — I10 ESSENTIAL HYPERTENSION: ICD-10-CM

## 2018-12-27 DIAGNOSIS — E55.9 VITAMIN D DEFICIENCY: Primary | ICD-10-CM

## 2018-12-27 DIAGNOSIS — E78.2 MIXED HYPERLIPIDEMIA: Primary | ICD-10-CM

## 2018-12-27 RX ORDER — PRAVASTATIN SODIUM 20 MG
20 TABLET ORAL DAILY
Qty: 30 TABLET | Refills: 2 | Status: SHIPPED | OUTPATIENT
Start: 2018-12-27 | End: 2020-03-03

## 2018-12-27 RX ORDER — LISINOPRIL 5 MG/1
5 TABLET ORAL 2 TIMES DAILY
Qty: 60 TABLET | Refills: 3 | Status: SHIPPED | OUTPATIENT
Start: 2018-12-27 | End: 2020-02-12 | Stop reason: SDUPTHER

## 2018-12-27 RX ORDER — ERGOCALCIFEROL (VITAMIN D2) 1250 MCG
50000 CAPSULE ORAL WEEKLY
Qty: 4 CAPSULE | Refills: 3 | Status: SHIPPED | OUTPATIENT
Start: 2018-12-27 | End: 2019-06-20 | Stop reason: SDUPTHER

## 2019-01-07 DIAGNOSIS — J30.2 SEASONAL ALLERGIES: Primary | ICD-10-CM

## 2019-01-07 RX ORDER — DESLORATADINE 5 MG/1
5 TABLET ORAL DAILY
Qty: 90 TABLET | Refills: 1 | Status: SHIPPED | OUTPATIENT
Start: 2019-01-07 | End: 2019-07-03 | Stop reason: SDUPTHER

## 2019-01-24 ENCOUNTER — OFFICE VISIT (OUTPATIENT)
Dept: FAMILY MEDICINE CLINIC | Age: 54
End: 2019-01-24
Payer: MEDICARE

## 2019-01-24 VITALS
SYSTOLIC BLOOD PRESSURE: 120 MMHG | BODY MASS INDEX: 35.78 KG/M2 | RESPIRATION RATE: 18 BRPM | WEIGHT: 270 LBS | OXYGEN SATURATION: 97 % | HEIGHT: 73 IN | HEART RATE: 72 BPM | DIASTOLIC BLOOD PRESSURE: 78 MMHG

## 2019-01-24 DIAGNOSIS — Z91.89 HISTORY OF PNEUMONIA AS INDICATION FOR 23-POLYVALENT PNEUMOCOCCAL POLYSACCHARIDE VACCINE: ICD-10-CM

## 2019-01-24 DIAGNOSIS — F41.9 ANXIETY: Primary | ICD-10-CM

## 2019-01-24 DIAGNOSIS — Z87.01 HISTORY OF PNEUMONIA AS INDICATION FOR 23-POLYVALENT PNEUMOCOCCAL POLYSACCHARIDE VACCINE: ICD-10-CM

## 2019-01-24 PROCEDURE — 3017F COLORECTAL CA SCREEN DOC REV: CPT | Performed by: NURSE PRACTITIONER

## 2019-01-24 PROCEDURE — 90732 PPSV23 VACC 2 YRS+ SUBQ/IM: CPT | Performed by: NURSE PRACTITIONER

## 2019-01-24 PROCEDURE — 99213 OFFICE O/P EST LOW 20 MIN: CPT | Performed by: NURSE PRACTITIONER

## 2019-01-24 PROCEDURE — G8417 CALC BMI ABV UP PARAM F/U: HCPCS | Performed by: NURSE PRACTITIONER

## 2019-01-24 PROCEDURE — G8427 DOCREV CUR MEDS BY ELIG CLIN: HCPCS | Performed by: NURSE PRACTITIONER

## 2019-01-24 PROCEDURE — 4004F PT TOBACCO SCREEN RCVD TLK: CPT | Performed by: NURSE PRACTITIONER

## 2019-01-24 PROCEDURE — G8484 FLU IMMUNIZE NO ADMIN: HCPCS | Performed by: NURSE PRACTITIONER

## 2019-01-24 PROCEDURE — G0009 ADMIN PNEUMOCOCCAL VACCINE: HCPCS | Performed by: NURSE PRACTITIONER

## 2019-01-24 RX ORDER — HYDROCODONE BITARTRATE AND ACETAMINOPHEN 10; 325 MG/1; MG/1
10-325 TABLET ORAL
Refills: 0 | COMMUNITY
Start: 2019-01-10

## 2019-01-24 RX ORDER — DIAZEPAM 5 MG/1
5 TABLET ORAL EVERY 12 HOURS PRN
Qty: 60 TABLET | Refills: 0 | Status: SHIPPED | OUTPATIENT
Start: 2019-01-24 | End: 2019-02-21 | Stop reason: SDUPTHER

## 2019-01-24 RX ORDER — DIAZEPAM 5 MG/1
5 TABLET ORAL EVERY 12 HOURS PRN
Qty: 60 TABLET | Refills: 0
Start: 2019-01-24 | End: 2019-01-24

## 2019-01-24 ASSESSMENT — ENCOUNTER SYMPTOMS
SHORTNESS OF BREATH: 0
DIARRHEA: 0
COUGH: 0
TROUBLE SWALLOWING: 0
ABDOMINAL PAIN: 0
SORE THROAT: 0
EYE PAIN: 0
VOMITING: 0
NAUSEA: 0
EYE REDNESS: 0
CHEST TIGHTNESS: 0

## 2019-02-21 ENCOUNTER — OFFICE VISIT (OUTPATIENT)
Dept: FAMILY MEDICINE CLINIC | Age: 54
End: 2019-02-21
Payer: MEDICARE

## 2019-02-21 VITALS
SYSTOLIC BLOOD PRESSURE: 137 MMHG | HEART RATE: 75 BPM | OXYGEN SATURATION: 95 % | DIASTOLIC BLOOD PRESSURE: 77 MMHG | BODY MASS INDEX: 36.05 KG/M2 | HEIGHT: 73 IN | WEIGHT: 272 LBS | RESPIRATION RATE: 16 BRPM

## 2019-02-21 DIAGNOSIS — F41.9 ANXIETY: ICD-10-CM

## 2019-02-21 DIAGNOSIS — C44.02 SQUAMOUS CELL CANCER OF LIP: Primary | ICD-10-CM

## 2019-02-21 PROCEDURE — G8484 FLU IMMUNIZE NO ADMIN: HCPCS | Performed by: NURSE PRACTITIONER

## 2019-02-21 PROCEDURE — G8427 DOCREV CUR MEDS BY ELIG CLIN: HCPCS | Performed by: NURSE PRACTITIONER

## 2019-02-21 PROCEDURE — 99214 OFFICE O/P EST MOD 30 MIN: CPT | Performed by: NURSE PRACTITIONER

## 2019-02-21 PROCEDURE — 4004F PT TOBACCO SCREEN RCVD TLK: CPT | Performed by: NURSE PRACTITIONER

## 2019-02-21 PROCEDURE — G8417 CALC BMI ABV UP PARAM F/U: HCPCS | Performed by: NURSE PRACTITIONER

## 2019-02-21 PROCEDURE — 3017F COLORECTAL CA SCREEN DOC REV: CPT | Performed by: NURSE PRACTITIONER

## 2019-02-21 PROCEDURE — G0444 DEPRESSION SCREEN ANNUAL: HCPCS | Performed by: NURSE PRACTITIONER

## 2019-02-21 RX ORDER — DIAZEPAM 5 MG/1
5 TABLET ORAL EVERY 12 HOURS PRN
Qty: 60 TABLET | Refills: 0 | Status: SHIPPED | OUTPATIENT
Start: 2019-02-21 | End: 2019-03-21 | Stop reason: SDUPTHER

## 2019-02-21 ASSESSMENT — PATIENT HEALTH QUESTIONNAIRE - PHQ9
10. IF YOU CHECKED OFF ANY PROBLEMS, HOW DIFFICULT HAVE THESE PROBLEMS MADE IT FOR YOU TO DO YOUR WORK, TAKE CARE OF THINGS AT HOME, OR GET ALONG WITH OTHER PEOPLE: 1
1. LITTLE INTEREST OR PLEASURE IN DOING THINGS: 1
SUM OF ALL RESPONSES TO PHQ QUESTIONS 1-9: 6
9. THOUGHTS THAT YOU WOULD BE BETTER OFF DEAD, OR OF HURTING YOURSELF: 0
SUM OF ALL RESPONSES TO PHQ QUESTIONS 1-9: 6
2. FEELING DOWN, DEPRESSED OR HOPELESS: 2
7. TROUBLE CONCENTRATING ON THINGS, SUCH AS READING THE NEWSPAPER OR WATCHING TELEVISION: 1
SUM OF ALL RESPONSES TO PHQ9 QUESTIONS 1 & 2: 3
4. FEELING TIRED OR HAVING LITTLE ENERGY: 1
8. MOVING OR SPEAKING SO SLOWLY THAT OTHER PEOPLE COULD HAVE NOTICED. OR THE OPPOSITE, BEING SO FIGETY OR RESTLESS THAT YOU HAVE BEEN MOVING AROUND A LOT MORE THAN USUAL: 0
5. POOR APPETITE OR OVEREATING: 1
6. FEELING BAD ABOUT YOURSELF - OR THAT YOU ARE A FAILURE OR HAVE LET YOURSELF OR YOUR FAMILY DOWN: 0
3. TROUBLE FALLING OR STAYING ASLEEP: 0

## 2019-02-21 ASSESSMENT — ENCOUNTER SYMPTOMS
SORE THROAT: 0
DIARRHEA: 0
COUGH: 1
CHEST TIGHTNESS: 0
EYE PAIN: 0
EYE REDNESS: 0
TROUBLE SWALLOWING: 0
SHORTNESS OF BREATH: 0
ABDOMINAL PAIN: 0

## 2019-03-21 ENCOUNTER — OFFICE VISIT (OUTPATIENT)
Dept: FAMILY MEDICINE CLINIC | Age: 54
End: 2019-03-21
Payer: MEDICARE

## 2019-03-21 VITALS
DIASTOLIC BLOOD PRESSURE: 73 MMHG | BODY MASS INDEX: 35.92 KG/M2 | SYSTOLIC BLOOD PRESSURE: 127 MMHG | WEIGHT: 271 LBS | RESPIRATION RATE: 18 BRPM | HEIGHT: 73 IN | HEART RATE: 66 BPM | OXYGEN SATURATION: 93 %

## 2019-03-21 DIAGNOSIS — F41.9 ANXIETY: Primary | ICD-10-CM

## 2019-03-21 PROCEDURE — 4004F PT TOBACCO SCREEN RCVD TLK: CPT | Performed by: NURSE PRACTITIONER

## 2019-03-21 PROCEDURE — 99213 OFFICE O/P EST LOW 20 MIN: CPT | Performed by: NURSE PRACTITIONER

## 2019-03-21 PROCEDURE — G8484 FLU IMMUNIZE NO ADMIN: HCPCS | Performed by: NURSE PRACTITIONER

## 2019-03-21 PROCEDURE — 3017F COLORECTAL CA SCREEN DOC REV: CPT | Performed by: NURSE PRACTITIONER

## 2019-03-21 PROCEDURE — G8417 CALC BMI ABV UP PARAM F/U: HCPCS | Performed by: NURSE PRACTITIONER

## 2019-03-21 PROCEDURE — G8427 DOCREV CUR MEDS BY ELIG CLIN: HCPCS | Performed by: NURSE PRACTITIONER

## 2019-03-21 RX ORDER — DIAZEPAM 5 MG/1
5 TABLET ORAL EVERY 12 HOURS PRN
Qty: 60 TABLET | Refills: 0 | Status: SHIPPED | OUTPATIENT
Start: 2019-03-21 | End: 2019-04-22 | Stop reason: SDUPTHER

## 2019-03-21 ASSESSMENT — ENCOUNTER SYMPTOMS
SHORTNESS OF BREATH: 0
NAUSEA: 0
DIARRHEA: 0
ABDOMINAL PAIN: 0
SORE THROAT: 0
CHEST TIGHTNESS: 0
TROUBLE SWALLOWING: 0
VOMITING: 0
EYE REDNESS: 0
EYE PAIN: 0
COUGH: 0

## 2019-04-08 ENCOUNTER — OFFICE VISIT (OUTPATIENT)
Dept: FAMILY MEDICINE CLINIC | Age: 54
End: 2019-04-08
Payer: MEDICARE

## 2019-04-08 VITALS
OXYGEN SATURATION: 95 % | DIASTOLIC BLOOD PRESSURE: 82 MMHG | RESPIRATION RATE: 18 BRPM | TEMPERATURE: 98.1 F | SYSTOLIC BLOOD PRESSURE: 125 MMHG | HEART RATE: 78 BPM

## 2019-04-08 DIAGNOSIS — J44.9 CHRONIC OBSTRUCTIVE PULMONARY DISEASE, UNSPECIFIED COPD TYPE (HCC): Primary | ICD-10-CM

## 2019-04-08 DIAGNOSIS — R05.9 COUGH: ICD-10-CM

## 2019-04-08 DIAGNOSIS — G47.9 DIFFICULTY SLEEPING: ICD-10-CM

## 2019-04-08 DIAGNOSIS — R06.2 WHEEZING: ICD-10-CM

## 2019-04-08 PROCEDURE — 99214 OFFICE O/P EST MOD 30 MIN: CPT | Performed by: NURSE PRACTITIONER

## 2019-04-08 PROCEDURE — G8427 DOCREV CUR MEDS BY ELIG CLIN: HCPCS | Performed by: NURSE PRACTITIONER

## 2019-04-08 PROCEDURE — G8417 CALC BMI ABV UP PARAM F/U: HCPCS | Performed by: NURSE PRACTITIONER

## 2019-04-08 PROCEDURE — 96372 THER/PROPH/DIAG INJ SC/IM: CPT | Performed by: NURSE PRACTITIONER

## 2019-04-08 PROCEDURE — 3023F SPIROM DOC REV: CPT | Performed by: NURSE PRACTITIONER

## 2019-04-08 PROCEDURE — 3017F COLORECTAL CA SCREEN DOC REV: CPT | Performed by: NURSE PRACTITIONER

## 2019-04-08 PROCEDURE — G8926 SPIRO NO PERF OR DOC: HCPCS | Performed by: NURSE PRACTITIONER

## 2019-04-08 PROCEDURE — 4004F PT TOBACCO SCREEN RCVD TLK: CPT | Performed by: NURSE PRACTITIONER

## 2019-04-08 RX ORDER — METHYLPREDNISOLONE SODIUM SUCCINATE 125 MG/2ML
125 INJECTION, POWDER, LYOPHILIZED, FOR SOLUTION INTRAMUSCULAR; INTRAVENOUS ONCE
Status: COMPLETED | OUTPATIENT
Start: 2019-04-08 | End: 2019-04-08

## 2019-04-08 RX ORDER — METHYLPREDNISOLONE 4 MG/1
TABLET ORAL
Qty: 21 TABLET | Refills: 0 | Status: SHIPPED | OUTPATIENT
Start: 2019-04-08 | End: 2020-03-03 | Stop reason: ALTCHOICE

## 2019-04-08 RX ORDER — LEVOFLOXACIN 750 MG/1
750 TABLET ORAL DAILY
Qty: 7 TABLET | Refills: 0 | Status: SHIPPED | OUTPATIENT
Start: 2019-04-08 | End: 2019-04-15

## 2019-04-08 RX ORDER — CEFTRIAXONE 1 G/1
1 INJECTION, POWDER, FOR SOLUTION INTRAMUSCULAR; INTRAVENOUS ONCE
Status: COMPLETED | OUTPATIENT
Start: 2019-04-08 | End: 2019-04-08

## 2019-04-08 RX ADMIN — METHYLPREDNISOLONE SODIUM SUCCINATE 125 MG: 125 INJECTION, POWDER, LYOPHILIZED, FOR SOLUTION INTRAMUSCULAR; INTRAVENOUS at 11:27

## 2019-04-08 RX ADMIN — CEFTRIAXONE 1 G: 1 INJECTION, POWDER, FOR SOLUTION INTRAMUSCULAR; INTRAVENOUS at 11:26

## 2019-04-08 ASSESSMENT — ENCOUNTER SYMPTOMS
SORE THROAT: 0
ABDOMINAL PAIN: 0
HEMOPTYSIS: 0
RHINORRHEA: 1
WHEEZING: 1
EYE REDNESS: 0
COLOR CHANGE: 0
VOMITING: 0
SINUS PAIN: 1
CHEST TIGHTNESS: 0
SHORTNESS OF BREATH: 1
DIARRHEA: 0
TROUBLE SWALLOWING: 0
EYE PAIN: 0
NAUSEA: 0
HEARTBURN: 0
COUGH: 1
BACK PAIN: 0

## 2019-04-08 NOTE — PATIENT INSTRUCTIONS
Education and Discharge Instructions:  Keep a list of your medicines with you at all times. Always bring a up to date list or the medication bottles when going to the doctor or hospital.   Always follow the directions on your medications unless the doctor or nurse has instructed you otherwise. Keep all medications out of reach of children. Store medicines according to the directions on the label. Seek emergency medical attention if you think you have used too much medication. A overdose can be fatal.  Don't share your medicines with anyone. It may harm them. Discard any unused or out dated medications. If you have any questions, ask your provider or pharmacist for more information. Be sure to keep all appointments for provider visits or tests. Please continue all your medications that the Provider has prescribed for you unless other [facility]    1. Mental Health Info and Treatment [phone]  2. Drug and Alcohol Detox Rehab treatment 24 hr [phone]  3. Stop Smoking Classes- [facility]-[phone]  4. [address] Program- prescription [phone]  5. Hospice Care [phone]  6. Adult/Child Protection [phone]    [employer]: Your online connection to your health information. Download the [employer] at [employer] ([employer]) or the [employer]	[place] ([employer]). [address] from the list of providers. [employer] Help Desk: 5-743-17-HTLHU    [employer]        We are committed to providing you with the best care possible. In order to help us achieve these goals please remember to bring all medications, herbal products, and over the counter supplements with you to each visit. If your provider has ordered testing for you, please be sure to follow up with our office if you have not received results within 7 days after the testing took place.      *If you receive a survey after visiting one of our offices, please take time to share your experience concerning your physician office visit. These surveys are confidential and no health information about you is shared.   We are eager to improve for you and we are counting on your feedback to help make that happen
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
## 2019-04-22 ENCOUNTER — OFFICE VISIT (OUTPATIENT)
Dept: FAMILY MEDICINE CLINIC | Age: 54
End: 2019-04-22
Payer: MEDICARE

## 2019-04-22 VITALS
BODY MASS INDEX: 35.23 KG/M2 | HEART RATE: 74 BPM | OXYGEN SATURATION: 97 % | WEIGHT: 267 LBS | DIASTOLIC BLOOD PRESSURE: 72 MMHG | SYSTOLIC BLOOD PRESSURE: 122 MMHG

## 2019-04-22 DIAGNOSIS — G47.00 INSOMNIA, UNSPECIFIED TYPE: Primary | ICD-10-CM

## 2019-04-22 DIAGNOSIS — F41.9 ANXIETY: ICD-10-CM

## 2019-04-22 PROCEDURE — 3017F COLORECTAL CA SCREEN DOC REV: CPT | Performed by: NURSE PRACTITIONER

## 2019-04-22 PROCEDURE — G8427 DOCREV CUR MEDS BY ELIG CLIN: HCPCS | Performed by: NURSE PRACTITIONER

## 2019-04-22 PROCEDURE — G8417 CALC BMI ABV UP PARAM F/U: HCPCS | Performed by: NURSE PRACTITIONER

## 2019-04-22 PROCEDURE — 4004F PT TOBACCO SCREEN RCVD TLK: CPT | Performed by: NURSE PRACTITIONER

## 2019-04-22 PROCEDURE — 99214 OFFICE O/P EST MOD 30 MIN: CPT | Performed by: NURSE PRACTITIONER

## 2019-04-22 RX ORDER — DIAZEPAM 5 MG/1
5 TABLET ORAL EVERY 12 HOURS PRN
Qty: 60 TABLET | Refills: 0 | Status: SHIPPED | OUTPATIENT
Start: 2019-04-22 | End: 2019-05-20 | Stop reason: SDUPTHER

## 2019-04-22 ASSESSMENT — ENCOUNTER SYMPTOMS
COUGH: 0
SORE THROAT: 0
TROUBLE SWALLOWING: 0
VOMITING: 0
BACK PAIN: 0
DIARRHEA: 0
NAUSEA: 0
EYE REDNESS: 0
COLOR CHANGE: 0
EYE PAIN: 0
SHORTNESS OF BREATH: 0
ABDOMINAL PAIN: 0
CHEST TIGHTNESS: 0

## 2019-04-22 NOTE — PROGRESS NOTES
SUBJECTIVE:    Patient ID: Waldemar Manrique is a 53 y.o. male. Chief Complaint   Patient presents with    Anxiety    Medication Refill       HPI     Patient's medications, allergies, past medical, surgical, social and family histories were reviewed and updated as appropriate. Current Outpatient Medications on File Prior to Visit   Medication Sig Dispense Refill    methylPREDNISolone (MEDROL DOSEPACK) 4 MG tablet Take by mouth 264576 stop 21 tablet 0    HYDROcodone-acetaminophen (NORCO)  MG per tablet Take  tablets by mouth every 4-6 hours as needed. Jacinta Gifford 0    desloratadine (CLARINEX) 5 MG tablet Take 1 tablet by mouth daily 90 tablet 1    lisinopril (PRINIVIL;ZESTRIL) 5 MG tablet Take 1 tablet by mouth 2 times daily 60 tablet 3    ergocalciferol (ERGOCALCIFEROL) 91503 units capsule Take 1 capsule by mouth once a week 4 capsule 3    pravastatin (PRAVACHOL) 20 MG tablet Take 1 tablet by mouth daily 30 tablet 2    triamterene-hydrochlorothiazide (MAXZIDE-25) 37.5-25 MG per tablet Take 1 tablet by mouth daily 30 tablet 11    testosterone cypionate (DEPOTESTOTERONE CYPIONATE) 200 MG/ML injection INJECT 2ML EVERY 2 WEEKS  5    budesonide-formoterol (SYMBICORT) 160-4.5 MCG/ACT AERO Inhale 2 puffs into the lungs 2 times daily      VENTOLIN  (90 Base) MCG/ACT inhaler   6    ASPIR-LOW 81 MG EC tablet Take 1 tablet by mouth daily  11    ipratropium (ATROVENT) 0.03 % nasal spray 1 spray by Nasal route 2 times daily  3    omeprazole (PRILOSEC) 40 MG delayed release capsule Take 1 capsule by mouth daily  6    montelukast (SINGULAIR) 10 MG tablet Take 10 mg by mouth nightly      fluticasone (FLONASE) 50 MCG/ACT nasal spray 1 spray by Nasal route daily 1 Bottle 3     No current facility-administered medications on file prior to visit. Review of Systems   Constitutional: Negative for activity change, appetite change, chills and fever.    HENT: Negative for congestion, ear pain, nosebleeds, sore throat and trouble swallowing. Eyes: Negative for pain and redness. Respiratory: Negative for cough, chest tightness and shortness of breath. Cardiovascular: Negative for chest pain, palpitations and leg swelling. Gastrointestinal: Negative for abdominal pain, diarrhea, nausea and vomiting. Genitourinary: Negative for difficulty urinating, dysuria and flank pain. Musculoskeletal: Negative for arthralgias, back pain and gait problem. Skin: Negative for color change, pallor, rash and wound. Allergic/Immunologic: Negative for environmental allergies and food allergies. Neurological: Negative for dizziness, numbness and headaches. Hematological: Negative for adenopathy. Does not bruise/bleed easily. Psychiatric/Behavioral: Negative for agitation and sleep disturbance. The patient is not nervous/anxious. OBJECTIVE:  /72 (Site: Right Upper Arm, Position: Sitting)   Pulse 74   Wt 267 lb (121.1 kg)   SpO2 97%   BMI 35.23 kg/m²       Physical Exam   Constitutional: He is oriented to person, place, and time. Vital signs are normal. He appears well-developed and well-nourished. He is active. Non-toxic appearance. He does not have a sickly appearance. He does not appear ill. No distress. HENT:   Head: Normocephalic and atraumatic. Right Ear: Hearing, tympanic membrane, external ear and ear canal normal.   Left Ear: Hearing, tympanic membrane, external ear and ear canal normal.   Nose: Nose normal. No mucosal edema, rhinorrhea or nose lacerations. Right sinus exhibits no maxillary sinus tenderness and no frontal sinus tenderness. Left sinus exhibits no maxillary sinus tenderness and no frontal sinus tenderness. Mouth/Throat: Uvula is midline, oropharynx is clear and moist and mucous membranes are normal. Normal dentition. No dental caries. No tonsillar exudate. Eyes: Pupils are equal, round, and reactive to light.  Conjunctivae, EOM and lids are normal. Right eye and vitals reviewed. No results found for requested labs within last 30 days. Hemoglobin A1C (%)   Date Value   12/26/2018 5.8     LDL Calculated (mg/dL)   Date Value   12/26/2018 73         Lab Results   Component Value Date    WBC 12.1 12/26/2018    NEUTROABS 7.4 12/26/2018    HGB 17.3 12/26/2018    HCT 50.2 12/26/2018    MCV 89 12/26/2018     12/26/2018       Lab Results   Component Value Date    TSH 1.310 12/26/2018         ASSESSMENT/PLAN:     Melia Kang was seen today for anxiety and medication refill. Diagnoses and all orders for this visit:    Anxiety  -     diazepam (VALIUM) 5 MG tablet; Take 1 tablet by mouth every 12 hours as needed for Anxiety or Sleep for up to 30 days. PATIENT COUNSELING     Counseling was provided today regarding the following topics: Healthy eating habits, Regular exercise, substance abuse and healthy sleep habits. Discussed use, benefit, and side effects of prescribed medications. Barriers to medication compliance addressed. All patient questions answered. Patient voiced understanding. Medications Discontinued During This Encounter   Medication Reason    diazepam (VALIUM) 5 MG tablet REORDER       No follow-ups on file. Flako Ocasio APRN - CNP     Education was provided for discussed topics. Call the office with worsening complaints or any side effects to any medications. If an emergency please call 911.     Maco Newberry MSN, APRN, FNP-BC, NP-C

## 2019-04-22 NOTE — PROGRESS NOTES
SUBJECTIVE:    Patient ID: Tony Bay is a 53 y.o. male. Chief Complaint   Patient presents with    Anxiety    Medication Refill       Anxiety: Patient presents to the clinic for a 1 month follow-up and medication refill on his Valium 5 mg PO BID that he takes PRN for CHRISTINA and insomnia. He has the following anxiety symptoms: none- well controlled with this medication and dosing. Onset of symptoms was approximately several years ago, stable since that time. He denies current suicidal and homicidal ideation. Family history significant for anxiety and heart disease. Possible organic causes contributing are: none. Risk factors: none Previous treatment includes Valium and none. He complains of the following side effects from the treatment: none. Patient reports this medication continues to work well and dosage. No adverse side effects noted or misuse of this medication. Patients mood is stable today. Sleep Apnea: Patient presents with possible obstructive sleep apnea. Patent has a several years history of symptoms of daytime fatigue, morning fatigue and hypertension. Patient generally gets approximately 4-5 hours of sleep per night, and states they generally have nightime awakenings. Snoring of moderate severity is present. Apneic episodes is present. Nasal obstruction is not present. Patient has not had tonsillectomy. Referral for sleep study was place at last encounter. Pending schedule time and date. Patient aware         Patient's medications, allergies, past medical, surgical, social and family histories were reviewed and updated as appropriate. Current Outpatient Medications on File Prior to Visit   Medication Sig Dispense Refill    methylPREDNISolone (MEDROL DOSEPACK) 4 MG tablet Take by mouth 214861 stop 21 tablet 0    HYDROcodone-acetaminophen (NORCO)  MG per tablet Take  tablets by mouth every 4-6 hours as needed. Cydne Benjamin   0    desloratadine (CLARINEX) 5 MG tablet Take 1 tablet by mouth daily 90 tablet 1    lisinopril (PRINIVIL;ZESTRIL) 5 MG tablet Take 1 tablet by mouth 2 times daily 60 tablet 3    ergocalciferol (ERGOCALCIFEROL) 22358 units capsule Take 1 capsule by mouth once a week 4 capsule 3    pravastatin (PRAVACHOL) 20 MG tablet Take 1 tablet by mouth daily 30 tablet 2    triamterene-hydrochlorothiazide (MAXZIDE-25) 37.5-25 MG per tablet Take 1 tablet by mouth daily 30 tablet 11    testosterone cypionate (DEPOTESTOTERONE CYPIONATE) 200 MG/ML injection INJECT 2ML EVERY 2 WEEKS  5    budesonide-formoterol (SYMBICORT) 160-4.5 MCG/ACT AERO Inhale 2 puffs into the lungs 2 times daily      VENTOLIN  (90 Base) MCG/ACT inhaler   6    ASPIR-LOW 81 MG EC tablet Take 1 tablet by mouth daily  11    ipratropium (ATROVENT) 0.03 % nasal spray 1 spray by Nasal route 2 times daily  3    omeprazole (PRILOSEC) 40 MG delayed release capsule Take 1 capsule by mouth daily  6    montelukast (SINGULAIR) 10 MG tablet Take 10 mg by mouth nightly      fluticasone (FLONASE) 50 MCG/ACT nasal spray 1 spray by Nasal route daily 1 Bottle 3     No current facility-administered medications on file prior to visit. Review of Systems   Constitutional: Negative for activity change, appetite change, chills and fever. HENT: Negative for congestion, ear pain, nosebleeds, sore throat and trouble swallowing. Eyes: Negative for pain and redness. Respiratory: Negative for cough, chest tightness and shortness of breath. Cardiovascular: Negative for chest pain, palpitations and leg swelling. Gastrointestinal: Negative for abdominal pain, diarrhea, nausea and vomiting. Genitourinary: Negative for difficulty urinating, dysuria and flank pain. Musculoskeletal: Negative for arthralgias, back pain and gait problem. Skin: Negative for color change, pallor, rash and wound. Allergic/Immunologic: Positive for environmental allergies. Negative for food allergies.    Neurological: Negative for dizziness, numbness and headaches. Hematological: Negative for adenopathy. Does not bruise/bleed easily. Psychiatric/Behavioral: Positive for sleep disturbance. Negative for agitation. The patient is nervous/anxious. OBJECTIVE:  /72 (Site: Right Upper Arm, Position: Sitting)   Pulse 74   Wt 267 lb (121.1 kg)   SpO2 97%   BMI 35.23 kg/m²       Physical Exam   Constitutional: He is oriented to person, place, and time. Vital signs are normal. He appears well-developed and well-nourished. He is active. Non-toxic appearance. He does not have a sickly appearance. He does not appear ill. No distress. HENT:   Head: Normocephalic and atraumatic. Right Ear: Hearing, tympanic membrane, external ear and ear canal normal.   Left Ear: Hearing, tympanic membrane, external ear and ear canal normal.   Nose: Nose normal. No mucosal edema, rhinorrhea or nose lacerations. Right sinus exhibits no maxillary sinus tenderness and no frontal sinus tenderness. Left sinus exhibits no maxillary sinus tenderness and no frontal sinus tenderness. Mouth/Throat: Uvula is midline, oropharynx is clear and moist and mucous membranes are normal. Normal dentition. No dental caries. No oropharyngeal exudate. No tonsillar exudate. Eyes: Pupils are equal, round, and reactive to light. Conjunctivae, EOM and lids are normal. Right eye exhibits no discharge. Left eye exhibits no discharge. No scleral icterus. Neck: Trachea normal, normal range of motion, full passive range of motion without pain and phonation normal. Neck supple. Normal carotid pulses, no hepatojugular reflux and no JVD present. No tracheal tenderness present. Carotid bruit is not present. No tracheal deviation present. No thyroid mass and no thyromegaly present. Cardiovascular: Normal rate, regular rhythm, S1 normal, S2 normal, normal heart sounds, intact distal pulses and normal pulses. Exam reveals no gallop, no distant heart sounds and no friction rub.    No murmur heard. Pulmonary/Chest: Effort normal and breath sounds normal. No accessory muscle usage or stridor. No apnea, no tachypnea and no bradypnea. No respiratory distress. He has no decreased breath sounds. He has no wheezes. He has no rhonchi. He has no rales. He exhibits no tenderness. Abdominal: Soft. Normal appearance and bowel sounds are normal. He exhibits no distension and no mass. There is no hepatosplenomegaly, splenomegaly or hepatomegaly. There is no tenderness. There is no rebound and no guarding. No hernia. Musculoskeletal: Normal range of motion. He exhibits no edema, tenderness or deformity. Lymphadenopathy:     He has no cervical adenopathy. He has no axillary adenopathy. Neurological: He is alert and oriented to person, place, and time. He has normal strength. He is not disoriented. He displays normal reflexes. No cranial nerve deficit or sensory deficit. He exhibits normal muscle tone. Coordination normal. GCS eye subscore is 4. GCS verbal subscore is 5. GCS motor subscore is 6. Skin: Skin is warm, dry and intact. Capillary refill takes less than 2 seconds. No bruising and no rash noted. He is not diaphoretic. No erythema. No pallor. Psychiatric: He has a normal mood and affect. His speech is normal and behavior is normal. Judgment and thought content normal. Cognition and memory are normal.   Nursing note and vitals reviewed. No results found for requested labs within last 30 days. Hemoglobin A1C (%)   Date Value   12/26/2018 5.8     LDL Calculated (mg/dL)   Date Value   12/26/2018 73       Lab Results   Component Value Date    WBC 12.1 12/26/2018    NEUTROABS 7.4 12/26/2018    HGB 17.3 12/26/2018    HCT 50.2 12/26/2018    MCV 89 12/26/2018     12/26/2018       Lab Results   Component Value Date    TSH 1.310 12/26/2018       ASSESSMENT/PLAN:     Trae Mclaughlni was seen today for anxiety and medication refill.     Diagnoses and all orders for this visit:    Insomnia, unspecified type  -     diazepam (VALIUM) 5 MG tablet; Take 1 tablet by mouth every 12 hours as needed for Anxiety or Sleep for up to 30 days. - The problem of recurrent insomnia is discussed. Avoidance of caffeine sources is strongly encouraged. Sleep hygiene issues are reviewed. The use of sedative hypnotics for temporary relief is appropriate; we discussed the addictive nature of these drugs, and a one-time only prescription for prn use of a hypnotic is given, to use no more than 3 times per week for 2-3 weeks. - Referral for sleep study placed. Anxiety  -     diazepam (VALIUM) 5 MG tablet; Take 1 tablet by mouth every 12 hours as needed for Anxiety or Sleep for up to 30 days.  - Stable    Controlled Substances Monitoring:     RX Monitoring 3/21/2019   Attestation The Prescription Monitoring Report for this patient was reviewed today. Acute Pain Prescriptions -   Chronic Pain Routine Monitoring Possible medication side effects, risk of tolerance/dependence & alternative treatments discussed. Chronic Pain > 80 MEDD -             PATIENT COUNSELING     Counseling was provided today regarding the following topics: Healthy eating habits, Regular exercise, substance abuse and healthy sleep habits. Discussed use, benefit, and side effects of prescribed medications. Barriers to medication compliance addressed. All patient questions answered. Patient voiced understanding. Medications Discontinued During This Encounter   Medication Reason    diazepam (VALIUM) 5 MG tablet REORDER       Return in about 1 month (around 5/22/2019). ANIYA Todd - CNP     Education was provided for discussed topics. Call the office with worsening complaints or any side effects to any medications. If an emergency please call 911.     Erin Cline, MSN, APRN, FNP-BC, NP-C

## 2019-05-20 ENCOUNTER — OFFICE VISIT (OUTPATIENT)
Dept: FAMILY MEDICINE CLINIC | Age: 54
End: 2019-05-20
Payer: MEDICARE

## 2019-05-20 VITALS
OXYGEN SATURATION: 94 % | HEIGHT: 73 IN | SYSTOLIC BLOOD PRESSURE: 125 MMHG | WEIGHT: 270.2 LBS | BODY MASS INDEX: 35.81 KG/M2 | HEART RATE: 84 BPM | DIASTOLIC BLOOD PRESSURE: 77 MMHG | RESPIRATION RATE: 18 BRPM

## 2019-05-20 DIAGNOSIS — F41.9 ANXIETY: ICD-10-CM

## 2019-05-20 DIAGNOSIS — G47.00 INSOMNIA, UNSPECIFIED TYPE: ICD-10-CM

## 2019-05-20 DIAGNOSIS — J40 BRONCHITIS: Primary | ICD-10-CM

## 2019-05-20 PROCEDURE — 96372 THER/PROPH/DIAG INJ SC/IM: CPT | Performed by: NURSE PRACTITIONER

## 2019-05-20 PROCEDURE — G8417 CALC BMI ABV UP PARAM F/U: HCPCS | Performed by: NURSE PRACTITIONER

## 2019-05-20 PROCEDURE — G8427 DOCREV CUR MEDS BY ELIG CLIN: HCPCS | Performed by: NURSE PRACTITIONER

## 2019-05-20 PROCEDURE — 4004F PT TOBACCO SCREEN RCVD TLK: CPT | Performed by: NURSE PRACTITIONER

## 2019-05-20 PROCEDURE — 99213 OFFICE O/P EST LOW 20 MIN: CPT | Performed by: NURSE PRACTITIONER

## 2019-05-20 PROCEDURE — 3017F COLORECTAL CA SCREEN DOC REV: CPT | Performed by: NURSE PRACTITIONER

## 2019-05-20 RX ORDER — METHYLPREDNISOLONE SODIUM SUCCINATE 125 MG/2ML
125 INJECTION, POWDER, LYOPHILIZED, FOR SOLUTION INTRAMUSCULAR; INTRAVENOUS ONCE
Status: COMPLETED | OUTPATIENT
Start: 2019-05-20 | End: 2019-05-20

## 2019-05-20 RX ORDER — METHYLPREDNISOLONE 4 MG/1
TABLET ORAL
Qty: 21 TABLET | Refills: 0 | Status: SHIPPED | OUTPATIENT
Start: 2019-05-20 | End: 2019-10-16 | Stop reason: SDUPTHER

## 2019-05-20 RX ORDER — DIAZEPAM 5 MG/1
5 TABLET ORAL EVERY 12 HOURS PRN
Qty: 60 TABLET | Refills: 0 | Status: SHIPPED | OUTPATIENT
Start: 2019-05-20 | End: 2019-06-20 | Stop reason: SDUPTHER

## 2019-05-20 RX ADMIN — METHYLPREDNISOLONE SODIUM SUCCINATE 125 MG: 125 INJECTION, POWDER, LYOPHILIZED, FOR SOLUTION INTRAMUSCULAR; INTRAVENOUS at 09:21

## 2019-05-20 ASSESSMENT — ENCOUNTER SYMPTOMS
WHEEZING: 1
COLOR CHANGE: 0
ABDOMINAL PAIN: 0
VOMITING: 0
COUGH: 1
BACK PAIN: 0
TROUBLE SWALLOWING: 0
EYE PAIN: 0
NAUSEA: 0
SORE THROAT: 0
CHEST TIGHTNESS: 0
DIARRHEA: 0
EYE REDNESS: 0
SHORTNESS OF BREATH: 0

## 2019-05-20 NOTE — PROGRESS NOTES
SUBJECTIVE:    Patient ID: Jaime Gonzalez is a 53 y.o. male. Chief Complaint   Patient presents with    Anxiety    Medication Refill       Zackery Aquino is a 53year old male who presents to the clinic today to follow-up on his chronic Anxiety. He has the following anxiety symptoms: none, he is stable today with medication that continues to control his symptoms. Onset of symptoms was approximately several years ago. Symptoms have been stable since that time. He denies current suicidal and homicidal ideation. Family history significant for anxiety and depression. Possible organic causes contributing are: situational, stress, insomnia. Risk factors: none Previous treatment includes medication Valium. He complains of the following medication side effects: none. Patient is requesting a refill on his Valium 5 mg PO Q12H PRN. This medications continues to work well for the patient. Vital signs are stable today. Patient takes medications as prescribed. Patient keeps medications locked in a safe at all times. Acutely, patient c/o worsening cough with productive green sputum. Patient is a smoker. Patient has a history of COPD and thinks he has bronchitis from worsening allergens. Plus, patient reports he has been working in hay which has caused him to wheeze more over the weekend. Patient is stable today, he is requesting a steroid injection to help with COPD, cough, and wheezing. Patient's medications, allergies, past medical, surgical, social and family histories were reviewed and updated as appropriate. Current Outpatient Medications on File Prior to Visit   Medication Sig Dispense Refill    methylPREDNISolone (MEDROL DOSEPACK) 4 MG tablet Take by mouth 737555 stop 21 tablet 0    HYDROcodone-acetaminophen (NORCO)  MG per tablet Take  tablets by mouth every 4-6 hours as needed. Angela Michelle   0    desloratadine (CLARINEX) 5 MG tablet Take 1 tablet by mouth daily 90 tablet 1    lisinopril (PRINIVIL;ZESTRIL) 5 MG tablet Take 1 tablet by mouth 2 times daily 60 tablet 3    ergocalciferol (ERGOCALCIFEROL) 77748 units capsule Take 1 capsule by mouth once a week 4 capsule 3    pravastatin (PRAVACHOL) 20 MG tablet Take 1 tablet by mouth daily 30 tablet 2    triamterene-hydrochlorothiazide (MAXZIDE-25) 37.5-25 MG per tablet Take 1 tablet by mouth daily 30 tablet 11    testosterone cypionate (DEPOTESTOTERONE CYPIONATE) 200 MG/ML injection INJECT 2ML EVERY 2 WEEKS  5    budesonide-formoterol (SYMBICORT) 160-4.5 MCG/ACT AERO Inhale 2 puffs into the lungs 2 times daily      VENTOLIN  (90 Base) MCG/ACT inhaler   6    ASPIR-LOW 81 MG EC tablet Take 1 tablet by mouth daily  11    ipratropium (ATROVENT) 0.03 % nasal spray 1 spray by Nasal route 2 times daily  3    omeprazole (PRILOSEC) 40 MG delayed release capsule Take 1 capsule by mouth daily  6    montelukast (SINGULAIR) 10 MG tablet Take 10 mg by mouth nightly      fluticasone (FLONASE) 50 MCG/ACT nasal spray 1 spray by Nasal route daily 1 Bottle 3     No current facility-administered medications on file prior to visit. Review of Systems   Constitutional: Negative for activity change, appetite change, chills and fever. HENT: Negative for congestion, ear pain, nosebleeds, sore throat and trouble swallowing. Eyes: Negative for pain and redness. Respiratory: Positive for cough and wheezing. Negative for chest tightness and shortness of breath. Cardiovascular: Negative for chest pain, palpitations and leg swelling. Gastrointestinal: Negative for abdominal pain, diarrhea, nausea and vomiting. Genitourinary: Negative for difficulty urinating, dysuria and flank pain. Musculoskeletal: Negative for arthralgias, back pain and gait problem. Skin: Negative for color change, pallor, rash and wound. Allergic/Immunologic: Positive for environmental allergies. Negative for food allergies.    Neurological: Negative for dizziness, numbness and headaches. Hematological: Negative for adenopathy. Does not bruise/bleed easily. Psychiatric/Behavioral: Positive for sleep disturbance. Negative for agitation. The patient is nervous/anxious. OBJECTIVE:  /77   Pulse 84   Resp 18   Ht 6' 1\" (1.854 m)   Wt 270 lb 3.2 oz (122.6 kg)   SpO2 94%   BMI 35.65 kg/m²       Physical Exam   Constitutional: He is oriented to person, place, and time. Vital signs are normal. He appears well-developed and well-nourished. He is active. Non-toxic appearance. He does not have a sickly appearance. He does not appear ill. No distress. HENT:   Head: Normocephalic and atraumatic. Right Ear: Hearing, tympanic membrane, external ear and ear canal normal.   Left Ear: Hearing, tympanic membrane, external ear and ear canal normal.   Nose: Rhinorrhea present. No mucosal edema or nose lacerations. Right sinus exhibits no maxillary sinus tenderness and no frontal sinus tenderness. Left sinus exhibits no maxillary sinus tenderness and no frontal sinus tenderness. Mouth/Throat: Uvula is midline and mucous membranes are normal. Normal dentition. No dental caries. Posterior oropharyngeal erythema present. No tonsillar exudate. Eyes: Pupils are equal, round, and reactive to light. Conjunctivae, EOM and lids are normal. Right eye exhibits no discharge. Left eye exhibits no discharge. No scleral icterus. Neck: Trachea normal, normal range of motion, full passive range of motion without pain and phonation normal. Neck supple. Normal carotid pulses, no hepatojugular reflux and no JVD present. No tracheal tenderness present. Carotid bruit is not present. No tracheal deviation present. No thyroid mass and no thyromegaly present. Cardiovascular: Normal rate, regular rhythm, S1 normal, S2 normal, normal heart sounds, intact distal pulses and normal pulses. Exam reveals no gallop, no distant heart sounds and no friction rub. No murmur heard.   Pulmonary/Chest: Effort normal. No accessory muscle usage or stridor. No apnea, no tachypnea and no bradypnea. He has no decreased breath sounds. He has wheezes in the right upper field and the left upper field. He has no rhonchi. He has no rales. He exhibits no tenderness. Abdominal: Soft. Normal appearance and bowel sounds are normal. He exhibits no distension and no mass. There is no hepatosplenomegaly, splenomegaly or hepatomegaly. There is no tenderness. There is no rebound and no guarding. No hernia. Musculoskeletal: Normal range of motion. He exhibits no edema, tenderness or deformity. Lymphadenopathy:     He has no cervical adenopathy. He has no axillary adenopathy. Neurological: He is alert and oriented to person, place, and time. He has normal strength. He is not disoriented. He displays normal reflexes. No cranial nerve deficit or sensory deficit. He exhibits normal muscle tone. Coordination normal. GCS eye subscore is 4. GCS verbal subscore is 5. GCS motor subscore is 6. Skin: Skin is warm, dry and intact. Capillary refill takes less than 2 seconds. No bruising and no rash noted. He is not diaphoretic. No erythema. No pallor. Psychiatric: He has a normal mood and affect. His speech is normal and behavior is normal. Judgment and thought content normal. Cognition and memory are normal.   Nursing note and vitals reviewed. No results found for requested labs within last 30 days. Hemoglobin A1C (%)   Date Value   12/26/2018 5.8     LDL Calculated (mg/dL)   Date Value   12/26/2018 73       Lab Results   Component Value Date    WBC 12.1 12/26/2018    NEUTROABS 7.4 12/26/2018    HGB 17.3 12/26/2018    HCT 50.2 12/26/2018    MCV 89 12/26/2018     12/26/2018     Lab Results   Component Value Date    TSH 1.310 12/26/2018         ASSESSMENT/PLAN:     Jose Raul Beth was seen today for anxiety and medication refill.     Diagnoses and all orders for this visit:    Bronchitis  -     methylPREDNISolone (MEDROL DOSEPACK) 4 MG tablet; Take by mouth 123891 stop  -     methylPREDNISolone sodium (SOLU-MEDROL) injection 125 mg  - SMOKING CESSATION STRONGLY ADVISED. CESSATION AND MAINTENANCE MEASURES DISCUSSED. Anxiety  -     diazepam (VALIUM) 5 MG tablet; Take 1 tablet by mouth every 12 hours as needed for Anxiety or Sleep for up to 30 days. Insomnia, unspecified type  -     diazepam (VALIUM) 5 MG tablet; Take 1 tablet by mouth every 12 hours as needed for Anxiety or Sleep for up to 30 days. Controlled Substances Monitoring:     RX Monitoring 5/20/2019   Attestation The Prescription Monitoring Report for this patient was reviewed today. Acute Pain Prescriptions -   Chronic Pain Routine Monitoring -   Chronic Pain > 80 MEDD -       PATIENT COUNSELING     Counseling was provided today regarding the following topics: Healthy eating habits, Regular exercise, substance abuse and healthy sleep habits. Discussed use, benefit, and side effects of prescribed medications. Barriers to medication compliance addressed. All patient questions answered. Patient voiced understanding. Medications Discontinued During This Encounter   Medication Reason    diazepam (VALIUM) 5 MG tablet REORDER       Return in about 1 month (around 6/20/2019). ANIYA Leija - CNP     Education was provided for discussed topics. Call the office with worsening complaints or any side effects to any medications. If an emergency please call 911.     Jasper Shelton, MSN, APRN, FNP-BC, NP-C

## 2019-06-20 ENCOUNTER — HOSPITAL ENCOUNTER (OUTPATIENT)
Facility: HOSPITAL | Age: 54
Discharge: HOME | End: 2019-06-20
Payer: MEDICARE

## 2019-06-20 ENCOUNTER — OFFICE VISIT (OUTPATIENT)
Dept: FAMILY MEDICINE CLINIC | Age: 54
End: 2019-06-20
Payer: MEDICARE

## 2019-06-20 VITALS
OXYGEN SATURATION: 95 % | RESPIRATION RATE: 18 BRPM | DIASTOLIC BLOOD PRESSURE: 83 MMHG | HEART RATE: 74 BPM | SYSTOLIC BLOOD PRESSURE: 129 MMHG | WEIGHT: 274 LBS | BODY MASS INDEX: 36.31 KG/M2 | HEIGHT: 73 IN

## 2019-06-20 DIAGNOSIS — G47.00 INSOMNIA, UNSPECIFIED TYPE: ICD-10-CM

## 2019-06-20 DIAGNOSIS — E55.9 VITAMIN D DEFICIENCY: ICD-10-CM

## 2019-06-20 DIAGNOSIS — I10 ESSENTIAL HYPERTENSION: Primary | ICD-10-CM

## 2019-06-20 DIAGNOSIS — R53.83 OTHER FATIGUE: ICD-10-CM

## 2019-06-20 DIAGNOSIS — R79.9 ABNORMAL FINDING OF BLOOD CHEMISTRY: ICD-10-CM

## 2019-06-20 DIAGNOSIS — F41.9 ANXIETY: ICD-10-CM

## 2019-06-20 DIAGNOSIS — I10 ESSENTIAL HYPERTENSION: ICD-10-CM

## 2019-06-20 PROCEDURE — 99214 OFFICE O/P EST MOD 30 MIN: CPT | Performed by: NURSE PRACTITIONER

## 2019-06-20 PROCEDURE — 3017F COLORECTAL CA SCREEN DOC REV: CPT | Performed by: NURSE PRACTITIONER

## 2019-06-20 PROCEDURE — 4004F PT TOBACCO SCREEN RCVD TLK: CPT | Performed by: NURSE PRACTITIONER

## 2019-06-20 PROCEDURE — G8427 DOCREV CUR MEDS BY ELIG CLIN: HCPCS | Performed by: NURSE PRACTITIONER

## 2019-06-20 PROCEDURE — G8417 CALC BMI ABV UP PARAM F/U: HCPCS | Performed by: NURSE PRACTITIONER

## 2019-06-20 PROCEDURE — 36415 COLL VENOUS BLD VENIPUNCTURE: CPT

## 2019-06-20 PROCEDURE — 83036 HEMOGLOBIN GLYCOSYLATED A1C: CPT | Performed by: NURSE PRACTITIONER

## 2019-06-20 RX ORDER — ERGOCALCIFEROL (VITAMIN D2) 1250 MCG
50000 CAPSULE ORAL WEEKLY
Qty: 4 CAPSULE | Refills: 3 | Status: SHIPPED | OUTPATIENT
Start: 2019-06-20 | End: 2019-09-18 | Stop reason: SDUPTHER

## 2019-06-20 RX ORDER — DIAZEPAM 5 MG/1
5 TABLET ORAL EVERY 12 HOURS PRN
Qty: 60 TABLET | Refills: 0 | Status: SHIPPED | OUTPATIENT
Start: 2019-06-20 | End: 2019-07-18 | Stop reason: SDUPTHER

## 2019-06-20 RX ORDER — CHLORTHALIDONE 25 MG/1
25 TABLET ORAL DAILY
Qty: 30 TABLET | Refills: 3 | Status: SHIPPED | OUTPATIENT
Start: 2019-06-20 | End: 2020-03-03

## 2019-06-20 ASSESSMENT — ENCOUNTER SYMPTOMS
ABDOMINAL PAIN: 0
EYE PAIN: 0
BACK PAIN: 1
VOMITING: 0
DIARRHEA: 0
EYE REDNESS: 0
NAUSEA: 0
TROUBLE SWALLOWING: 0
COLOR CHANGE: 0
CHEST TIGHTNESS: 0

## 2019-06-20 NOTE — PROGRESS NOTES
SUBJECTIVE:    Patient ID: Linwood Bassett is a 53 y.o. male. Chief Complaint   Patient presents with    Anxiety    Medication Refill       Anxiety: Patient 1 month follow-up for anxiety disorder and sleep disturbance. Patient is requesting a refill on prescribed Valium 5 mg PO BID PRN. He has the following anxiety symptoms: none, stable with current medications. Onset of symptoms was approximately several years ago, stable since that time. He denies current suicidal and homicidal ideation. Family history significant for anxiety. Possible organic causes contributing are: endocrine/metabolic. Risk factors: none. Previous treatment includes Valium- working well, for years. He complains of the following side effects from the treatment: none. Patient is talkative, friendly, in a good mood today. Edema: Patient complains of edema. The location of the edema is generalized. The edema has been mild. Onset of symptoms was 2 weeks ago, stable since that time. The edema is present intermittently. The patient states the problem is intermittent for 1 month. The swelling has been aggravated by flying or long car trips, hot weather and increased salt intake, relieved by diuretics, elevation of involved area, low-salt diet, and been associated with weight gain. Cardiac risk factors include diabetes mellitus, dyslipidemia, hypertension and male gender. Patient's medications, allergies, past medical, surgical, social and family histories were reviewed and updated as appropriate. Current Outpatient Medications on File Prior to Visit   Medication Sig Dispense Refill    methylPREDNISolone (MEDROL DOSEPACK) 4 MG tablet Take by mouth 106804 stop 21 tablet 0    methylPREDNISolone (MEDROL DOSEPACK) 4 MG tablet Take by mouth [id number] stop 21 tablet 0    HYDROcodone-acetaminophen (NORCO)  MG per tablet Take  tablets by mouth every 4-6 hours as needed. Yousif Gu   0    desloratadine (CLARINEX) 5 MG tablet Take 1 tablet by mouth daily 90 tablet 1    lisinopril (PRINIVIL;ZESTRIL) 5 MG tablet Take 1 tablet by mouth 2 times daily 60 tablet 3    pravastatin (PRAVACHOL) 20 MG tablet Take 1 tablet by mouth daily 30 tablet 2    triamterene-hydrochlorothiazide (MAXZIDE-25) 37.5-25 MG per tablet Take 1 tablet by mouth daily 30 tablet 11    testosterone cypionate (DEPOTESTOTERONE CYPIONATE) 200 MG/ML injection INJECT 2ML EVERY 2 WEEKS  5    budesonide-formoterol (SYMBICORT) 160-4.5 MCG/ACT AERO Inhale 2 puffs into the lungs 2 times daily      VENTOLIN  (90 Base) MCG/ACT inhaler   6    ASPIR-LOW 81 MG EC tablet Take 1 tablet by mouth daily  11    ipratropium (ATROVENT) 0.03 % nasal spray 1 spray by Nasal route 2 times daily  3    omeprazole (PRILOSEC) 40 MG delayed release capsule Take 1 capsule by mouth daily  6    montelukast (SINGULAIR) 10 MG tablet Take 10 mg by mouth nightly      fluticasone (FLONASE) 50 MCG/ACT nasal spray 1 spray by Nasal route daily 1 Bottle 3     No current facility-administered medications on file prior to visit. Review of Systems   Constitutional: Negative for activity change and appetite change. HENT: Negative for nosebleeds and trouble swallowing. Eyes: Negative for pain and redness. Respiratory: Negative for chest tightness. Cardiovascular: Positive for leg swelling. Negative for palpitations. Gastrointestinal: Negative for abdominal pain, diarrhea, nausea and vomiting. Genitourinary: Negative for difficulty urinating, dysuria and flank pain. Musculoskeletal: Positive for arthralgias and back pain. Negative for gait problem. Skin: Negative for color change, pallor and wound. Allergic/Immunologic: Negative for food allergies. Neurological: Negative for dizziness and numbness. Hematological: Negative for adenopathy. Does not bruise/bleed easily. Psychiatric/Behavioral: Positive for sleep disturbance. Negative for agitation. The patient is nervous/anxious. OBJECTIVE:  /83   Pulse 74   Resp 18   Ht 6' 1\" (1.854 m)   Wt 274 lb (124.3 kg)   SpO2 95%   BMI 36.15 kg/m²       Physical Exam   Constitutional: He is oriented to person, place, and time. Vital signs are normal. He appears well-developed and well-nourished. He is active. Non-toxic appearance. He does not have a sickly appearance. He does not appear ill. No distress. HENT:   Head: Normocephalic and atraumatic. Right Ear: Hearing, external ear and ear canal normal. There is tenderness. A middle ear effusion is present. Left Ear: Hearing, external ear and ear canal normal. There is tenderness. A middle ear effusion is present. Nose: Rhinorrhea present. No mucosal edema. Right sinus exhibits no maxillary sinus tenderness and no frontal sinus tenderness. Left sinus exhibits no maxillary sinus tenderness and no frontal sinus tenderness. Mouth/Throat: Uvula is midline and mucous membranes are normal. Normal dentition. No dental caries. Posterior oropharyngeal erythema present. No oropharyngeal exudate. No tonsillar exudate. Eyes: Pupils are equal, round, and reactive to light. Conjunctivae, EOM and lids are normal. Right eye exhibits no discharge. Left eye exhibits no discharge. No scleral icterus. Neck: Trachea normal, normal range of motion, full passive range of motion without pain and phonation normal. Neck supple. Normal carotid pulses, no hepatojugular reflux and no JVD present. No tracheal tenderness present. Carotid bruit is not present. No tracheal deviation present. No thyroid mass and no thyromegaly present. Cardiovascular: Normal rate, regular rhythm, S1 normal, S2 normal, normal heart sounds, intact distal pulses and normal pulses. Exam reveals no gallop, no distant heart sounds and no friction rub. No murmur heard. Pulmonary/Chest: Effort normal. No accessory muscle usage or stridor. No apnea, no tachypnea and no bradypnea. No respiratory distress.  He has no decreased hypertension  -     chlorthalidone (HYGROTON) 25 MG tablet; Take 1 tablet by mouth daily  -     CBC Auto Differential; Future  -     Comprehensive Metabolic Panel; Future  -     TSH with Reflex; Future    Anxiety  -     diazepam (VALIUM) 5 MG tablet; Take 1 tablet by mouth every 12 hours as needed for Anxiety or Sleep for up to 30 days. -     CBC Auto Differential; Future  -     Comprehensive Metabolic Panel; Future  -     TSH with Reflex; Future  -     POCT glycosylated hemoglobin (Hb A1C)    Insomnia, unspecified type  -     diazepam (VALIUM) 5 MG tablet; Take 1 tablet by mouth every 12 hours as needed for Anxiety or Sleep for up to 30 days. Vitamin D deficiency  -     ergocalciferol (ERGOCALCIFEROL) 10721 units capsule; Take 1 capsule by mouth once a week    Other fatigue  -     CBC Auto Differential; Future  -     Comprehensive Metabolic Panel; Future  -     TSH with Reflex; Future  -     POCT glycosylated hemoglobin (Hb A1C)    Abnormal finding of blood chemistry   -     POCT glycosylated hemoglobin (Hb A1C)        Medications Discontinued During This Encounter   Medication Reason    diazepam (VALIUM) 5 MG tablet REORDER    ergocalciferol (ERGOCALCIFEROL) 59471 units capsule REORDER       Return in about 1 month (around 7/20/2019). Lazarus Herald, APRN - CNP     Education was provided for discussed topics. Call the office with worsening complaints or any side effects to any medications. If an emergency please call 911.     Vanesa Vogel, MSN, APRN, FNP-BC, NP-C

## 2019-06-20 NOTE — PATIENT INSTRUCTIONS
Education and Discharge Instructions:  Keep a list of your medicines with you at all times. Always bring a up to date list or the medication bottles when going to the doctor or hospital.   Always follow the directions on your medications unless the doctor or nurse has instructed you otherwise. Keep all medications out of reach of children. Store medicines according to the directions on the label. Seek emergency medical attention if you think you have used too much medication. A overdose can be fatal.  Don't share your medicines with anyone. It may harm them. Discard any unused or out dated medications. If you have any questions, ask your provider or pharmacist for more information. Be sure to keep all appointments for provider visits or tests. Please continue all your medications that the Provider has prescribed for you unless other [facility]    1. Mental Health Info and Treatment [phone]  2. Drug and Alcohol Detox Rehab treatment 24 hr [phone]  3. Stop Smoking Classes- [facility] - [place]-[phone]  4. [address] Program- prescription khrsorsxse-[phone]  5. Hospice Care [phone]  6. Adult/Child Protection [phone]    [employer]: Your online connection to your health information. Download the [employer] at [employer]) or the [employer]	[place] ([employer]). [address] from the list of providers. [employer] Help Desk: 9-883-27-JTGWX    [place]        We are committed to providing you with the best care possible. In order to help us achieve these goals please remember to bring all medications, herbal products, and over the counter supplements with you to each visit. If your provider has ordered testing for you, please be sure to follow up with our office if you have not received results within 7 days after the testing took place.      *If you receive a survey

## 2019-07-03 DIAGNOSIS — J30.2 SEASONAL ALLERGIES: ICD-10-CM

## 2019-07-03 RX ORDER — DESLORATADINE 5 MG/1
5 TABLET ORAL DAILY
Qty: 90 TABLET | Refills: 1 | Status: SHIPPED | OUTPATIENT
Start: 2019-07-03 | End: 2019-10-07 | Stop reason: SDUPTHER

## 2019-07-18 ENCOUNTER — OFFICE VISIT (OUTPATIENT)
Dept: FAMILY MEDICINE CLINIC | Age: 54
End: 2019-07-18
Payer: MEDICARE

## 2019-07-18 VITALS
OXYGEN SATURATION: 91 % | BODY MASS INDEX: 35.52 KG/M2 | WEIGHT: 268 LBS | HEART RATE: 80 BPM | DIASTOLIC BLOOD PRESSURE: 76 MMHG | HEIGHT: 73 IN | SYSTOLIC BLOOD PRESSURE: 112 MMHG | RESPIRATION RATE: 18 BRPM

## 2019-07-18 DIAGNOSIS — G47.00 INSOMNIA, UNSPECIFIED TYPE: ICD-10-CM

## 2019-07-18 DIAGNOSIS — Z87.891 PERSONAL HISTORY OF NICOTINE DEPENDENCE: ICD-10-CM

## 2019-07-18 DIAGNOSIS — Z12.2 ENCOUNTER FOR SCREENING FOR LUNG CANCER: Primary | ICD-10-CM

## 2019-07-18 DIAGNOSIS — F41.9 ANXIETY: ICD-10-CM

## 2019-07-18 PROCEDURE — 4004F PT TOBACCO SCREEN RCVD TLK: CPT | Performed by: NURSE PRACTITIONER

## 2019-07-18 PROCEDURE — 99213 OFFICE O/P EST LOW 20 MIN: CPT | Performed by: NURSE PRACTITIONER

## 2019-07-18 PROCEDURE — G8427 DOCREV CUR MEDS BY ELIG CLIN: HCPCS | Performed by: NURSE PRACTITIONER

## 2019-07-18 PROCEDURE — G8417 CALC BMI ABV UP PARAM F/U: HCPCS | Performed by: NURSE PRACTITIONER

## 2019-07-18 PROCEDURE — 3017F COLORECTAL CA SCREEN DOC REV: CPT | Performed by: NURSE PRACTITIONER

## 2019-07-18 RX ORDER — DIAZEPAM 5 MG/1
5 TABLET ORAL EVERY 12 HOURS PRN
Qty: 60 TABLET | Refills: 0 | Status: SHIPPED | OUTPATIENT
Start: 2019-07-18 | End: 2019-08-17

## 2019-07-18 ASSESSMENT — ENCOUNTER SYMPTOMS
SHORTNESS OF BREATH: 0
COUGH: 0
DIARRHEA: 0
RESPIRATORY NEGATIVE: 1
ABDOMINAL PAIN: 0
VOMITING: 0
ALLERGIC/IMMUNOLOGIC NEGATIVE: 1
GASTROINTESTINAL NEGATIVE: 1
NAUSEA: 0

## 2019-07-19 ENCOUNTER — TELEPHONE (OUTPATIENT)
Dept: CT IMAGING | Facility: HOSPITAL | Age: 54
End: 2019-07-19

## 2019-08-18 ASSESSMENT — ENCOUNTER SYMPTOMS
EYE REDNESS: 0
VOMITING: 0
EYE PAIN: 0
DIARRHEA: 0
ABDOMINAL PAIN: 0
TROUBLE SWALLOWING: 0
BACK PAIN: 1
CHEST TIGHTNESS: 0
COLOR CHANGE: 0
NAUSEA: 0

## 2019-08-19 ENCOUNTER — OFFICE VISIT (OUTPATIENT)
Dept: FAMILY MEDICINE CLINIC | Age: 54
End: 2019-08-19
Payer: MEDICARE

## 2019-08-19 VITALS
DIASTOLIC BLOOD PRESSURE: 67 MMHG | OXYGEN SATURATION: 92 % | RESPIRATION RATE: 18 BRPM | SYSTOLIC BLOOD PRESSURE: 123 MMHG | HEART RATE: 79 BPM

## 2019-08-19 DIAGNOSIS — I10 ESSENTIAL HYPERTENSION: ICD-10-CM

## 2019-08-19 DIAGNOSIS — G47.00 INSOMNIA, UNSPECIFIED TYPE: ICD-10-CM

## 2019-08-19 DIAGNOSIS — F41.9 ANXIETY: ICD-10-CM

## 2019-08-19 PROCEDURE — 3017F COLORECTAL CA SCREEN DOC REV: CPT | Performed by: NURSE PRACTITIONER

## 2019-08-19 PROCEDURE — G8417 CALC BMI ABV UP PARAM F/U: HCPCS | Performed by: NURSE PRACTITIONER

## 2019-08-19 PROCEDURE — 4004F PT TOBACCO SCREEN RCVD TLK: CPT | Performed by: NURSE PRACTITIONER

## 2019-08-19 PROCEDURE — 99214 OFFICE O/P EST MOD 30 MIN: CPT | Performed by: NURSE PRACTITIONER

## 2019-08-19 PROCEDURE — G8427 DOCREV CUR MEDS BY ELIG CLIN: HCPCS | Performed by: NURSE PRACTITIONER

## 2019-08-19 RX ORDER — TRIAMTERENE AND HYDROCHLOROTHIAZIDE 37.5; 25 MG/1; MG/1
1 TABLET ORAL DAILY
Qty: 30 TABLET | Refills: 11 | Status: SHIPPED | OUTPATIENT
Start: 2019-08-19 | End: 2020-03-03 | Stop reason: SDUPTHER

## 2019-08-19 RX ORDER — DIAZEPAM 5 MG/1
5 TABLET ORAL EVERY 12 HOURS PRN
Qty: 60 TABLET | Refills: 0 | Status: SHIPPED | OUTPATIENT
Start: 2019-08-19 | End: 2019-09-18 | Stop reason: SDUPTHER

## 2019-08-19 ASSESSMENT — ENCOUNTER SYMPTOMS: SHORTNESS OF BREATH: 1

## 2019-08-19 NOTE — PROGRESS NOTES
Allergic/Immunologic: Negative for food allergies. Neurological: Negative for dizziness and numbness. Hematological: Negative for adenopathy. Does not bruise/bleed easily. Psychiatric/Behavioral: Positive for sleep disturbance. Negative for agitation. The patient is nervous/anxious. OBJECTIVE:  /67   Pulse 79   Resp 18   SpO2 92%       Physical Exam   Constitutional: He is oriented to person, place, and time. Vital signs are normal. He appears well-developed and well-nourished. He is active. Non-toxic appearance. He does not have a sickly appearance. He does not appear ill. No distress. HENT:   Head: Normocephalic and atraumatic. Right Ear: Hearing, external ear and ear canal normal. There is tenderness. A middle ear effusion is present. Left Ear: Hearing, external ear and ear canal normal. There is tenderness. A middle ear effusion is present. Nose: Rhinorrhea present. No mucosal edema. Right sinus exhibits no maxillary sinus tenderness and no frontal sinus tenderness. Left sinus exhibits no maxillary sinus tenderness and no frontal sinus tenderness. Mouth/Throat: Uvula is midline and mucous membranes are normal. Normal dentition. No dental caries. Posterior oropharyngeal erythema present. No oropharyngeal exudate. No tonsillar exudate. Eyes: Pupils are equal, round, and reactive to light. Conjunctivae, EOM and lids are normal. Right eye exhibits no discharge. Left eye exhibits no discharge. No scleral icterus. Neck: Trachea normal, normal range of motion, full passive range of motion without pain and phonation normal. Neck supple. Normal carotid pulses, no hepatojugular reflux and no JVD present. No tracheal tenderness present. Carotid bruit is not present. No tracheal deviation present. No thyroid mass and no thyromegaly present. Cardiovascular: Normal rate, regular rhythm, S1 normal, S2 normal, normal heart sounds, intact distal pulses and normal pulses.  Exam reveals no

## 2019-08-19 NOTE — PATIENT INSTRUCTIONS
Education and Discharge Instructions:  Keep a list of your medicines with you at all times. Always bring a up to date list or the medication bottles when going to the doctor or hospital.   Always follow the directions on your medications unless the doctor or nurse has instructed you otherwise. Keep all medications out of reach of children. Store medicines according to the directions on the label. Seek emergency medical attention if you think you have used too much medication. A overdose can be fatal.  Don't share your medicines with anyone. It may harm them. Discard any unused or out dated medications. If you have any questions, ask your provider or pharmacist for more information. Be sure to keep all appointments for provider visits or tests. Please continue all your medications that the Provider has prescribed for you unless other [facility]    1. Mental Health Info and Treatment [id number]-[phone]  2. Drug and Alcohol Detox Rehab treatment 24 hr [id number]-[phone]  3. Stop Smoking Classes- [facility]-[phone]  4. [address] Program- prescription [phone]  5. Hospice Care [phone]  6. Adult/Child Protection [phone]    [employer]: Your online connection to your health information. Download the [employer] [employer] at [employer] ([employer]) or the [employer]	[place] ([employer]). [address] from the list of providers. [employer] Help Desk: 5-798-94-DAHGF    [employer]        We are committed to providing you with the best care possible. In order to help us achieve these goals please remember to bring all medications, herbal products, and over the counter supplements with you to each visit. If your provider has ordered testing for you, please be sure to follow up with our office if you have not received results within 7 days after the testing took place.      *If you receive a survey after visiting one of our offices, please take time to share your experience concerning your physician office visit. These surveys are confidential and no health information about you is shared.   We are eager to improve for you and we are counting on your feedback to help make that happen

## 2019-09-18 ENCOUNTER — HOSPITAL ENCOUNTER (OUTPATIENT)
Facility: HOSPITAL | Age: 54
Discharge: HOME | End: 2019-09-18
Payer: MEDICARE

## 2019-09-18 ENCOUNTER — OFFICE VISIT (OUTPATIENT)
Dept: FAMILY MEDICINE CLINIC | Age: 54
End: 2019-09-18
Payer: MEDICARE

## 2019-09-18 VITALS
RESPIRATION RATE: 20 BRPM | HEIGHT: 73 IN | DIASTOLIC BLOOD PRESSURE: 78 MMHG | BODY MASS INDEX: 35.92 KG/M2 | SYSTOLIC BLOOD PRESSURE: 139 MMHG | WEIGHT: 271 LBS | OXYGEN SATURATION: 93 % | HEART RATE: 83 BPM

## 2019-09-18 DIAGNOSIS — G47.00 INSOMNIA, UNSPECIFIED TYPE: ICD-10-CM

## 2019-09-18 DIAGNOSIS — E55.9 VITAMIN D DEFICIENCY: ICD-10-CM

## 2019-09-18 DIAGNOSIS — Z71.89 ACP (ADVANCE CARE PLANNING): ICD-10-CM

## 2019-09-18 DIAGNOSIS — Z87.891 PERSONAL HISTORY OF TOBACCO USE, PRESENTING HAZARDS TO HEALTH: ICD-10-CM

## 2019-09-18 DIAGNOSIS — I10 ESSENTIAL HYPERTENSION: ICD-10-CM

## 2019-09-18 DIAGNOSIS — Z00.00 ROUTINE GENERAL MEDICAL EXAMINATION AT A HEALTH CARE FACILITY: ICD-10-CM

## 2019-09-18 DIAGNOSIS — I10 ESSENTIAL HYPERTENSION: Primary | ICD-10-CM

## 2019-09-18 DIAGNOSIS — Z72.89 OTHER PROBLEMS RELATED TO LIFESTYLE: ICD-10-CM

## 2019-09-18 DIAGNOSIS — F41.9 ANXIETY: ICD-10-CM

## 2019-09-18 PROCEDURE — G0438 PPPS, INITIAL VISIT: HCPCS | Performed by: NURSE PRACTITIONER

## 2019-09-18 PROCEDURE — 99406 BEHAV CHNG SMOKING 3-10 MIN: CPT | Performed by: NURSE PRACTITIONER

## 2019-09-18 PROCEDURE — 3017F COLORECTAL CA SCREEN DOC REV: CPT | Performed by: NURSE PRACTITIONER

## 2019-09-18 PROCEDURE — 36415 COLL VENOUS BLD VENIPUNCTURE: CPT

## 2019-09-18 PROCEDURE — 99497 ADVNCD CARE PLAN 30 MIN: CPT | Performed by: NURSE PRACTITIONER

## 2019-09-18 PROCEDURE — G0447 BEHAVIOR COUNSEL OBESITY 15M: HCPCS | Performed by: NURSE PRACTITIONER

## 2019-09-18 RX ORDER — ERGOCALCIFEROL (VITAMIN D2) 1250 MCG
50000 CAPSULE ORAL WEEKLY
Qty: 4 CAPSULE | Refills: 3 | Status: SHIPPED | OUTPATIENT
Start: 2019-09-18 | End: 2020-03-03

## 2019-09-18 RX ORDER — NALOXONE HYDROCHLORIDE 4 MG/.1ML
4 SPRAY NASAL
Refills: 0 | COMMUNITY
Start: 2019-09-10

## 2019-09-18 RX ORDER — DIAZEPAM 5 MG/1
5 TABLET ORAL EVERY 12 HOURS PRN
Qty: 60 TABLET | Refills: 0 | Status: SHIPPED | OUTPATIENT
Start: 2019-09-18 | End: 2019-10-16 | Stop reason: SDUPTHER

## 2019-09-18 ASSESSMENT — LIFESTYLE VARIABLES: HOW OFTEN DO YOU HAVE A DRINK CONTAINING ALCOHOL: 0

## 2019-09-18 ASSESSMENT — PATIENT HEALTH QUESTIONNAIRE - PHQ9
SUM OF ALL RESPONSES TO PHQ QUESTIONS 1-9: 1
SUM OF ALL RESPONSES TO PHQ QUESTIONS 1-9: 1

## 2019-09-18 NOTE — PATIENT INSTRUCTIONS
several lifestyle changes your doctor may recommend to lower your high blood pressure. Your doctor may also want you to decrease the amount of sodium in your diet. Lowering sodium while following the DASH diet can lower blood pressure even further than just the DASH diet alone. Follow-up care is a key part of your treatment and safety. Be sure to make and go to all appointments, and call your doctor if you are having problems. It's also a good idea to know your test results and keep a list of the medicines you take. How can you care for yourself at home? Following the DASH diet  · Eat 4 to 5 servings of fruit each day. A serving is 1 medium-sized piece of fruit, ½ cup chopped or canned fruit, 1/4 cup dried fruit, or 4 ounces (½ cup) of fruit juice. Choose fruit more often than fruit juice. · Eat 4 to 5 servings of vegetables each day. A serving is 1 cup of lettuce or raw leafy vegetables, ½ cup of chopped or cooked vegetables, or 4 ounces (½ cup) of vegetable juice. Choose vegetables more often than vegetable juice. · Get 2 to 3 servings of low-fat and fat-free dairy each day. A serving is 8 ounces of milk, 1 cup of yogurt, or 1 ½ ounces of cheese. · Eat 6 to 8 servings of grains each day. A serving is 1 slice of bread, 1 ounce of dry cereal, or ½ cup of cooked rice, pasta, or cooked cereal. Try to choose whole-grain products as much as possible. · Limit lean meat, poultry, and fish to 2 servings each day. A serving is 3 ounces, about the size of a deck of cards. · Eat 4 to 5 servings of nuts, seeds, and legumes (cooked dried beans, lentils, and split peas) each week. A serving is 1/3 cup of nuts, 2 tablespoons of seeds, or ½ cup of cooked beans or peas. · Limit fats and oils to 2 to 3 servings each day. A serving is 1 teaspoon of vegetable oil or 2 tablespoons of salad dressing. · Limit sweets and added sugars to 5 servings or less a week.  A serving is 1 tablespoon jelly or jam, ½ cup sorbet, or 1 cup of for your use of this information. Learning About Healthy Weight  What is a healthy weight? A healthy weight is the weight at which you feel good about yourself and have energy for work and play. It's also one that lowers your risk for health problems. What can you do to stay at a healthy weight? It can be hard to stay at a healthy weight, especially when fast food, vending-machine snacks, and processed foods are so easy to find. And with your busy lifestyle, activity may be low on your list of things to do. But staying at a healthy weight may be easier than you think. Here are some dos and don'ts for staying at a healthy weight:  Do eat healthy foods  The kinds of foods you eat have a big impact on both your weight and your health. Reaching and staying at a healthy weight is not about going on a diet. It's about making healthier food choices every day and changing your diet for good. Healthy eating means eating a variety of foods so that you get all the nutrients you need. Your body needs protein, carbohydrate, and fats for energy. They keep your heart beating, your brain active, and your muscles working. On most days, try to eat from each food group. This means eating a variety of:  · Whole grains, such as whole wheat breads and pastas. · Fruits and vegetables. · Dairy products, such as low-fat milk, yogurt, and cheese. · Lean proteins, such as all types of fish, chicken without the skin, and beans. Don't have too much or too little of one thing. All foods, if eaten in moderation, can be part of healthy eating. Even sweets can be okay. If your favorite foods are high in fat, salt, sugar, or calories, limit how often you eat them. Eat smaller servings, or look for healthy substitutes. Do watch what you eat  Many people eat more than their bodies need. Part of staying at a healthy weight means learning how much food you really need from day to day and not eating more than that.  Even with

## 2019-09-18 NOTE — PROGRESS NOTES
Medicare Annual Wellness Visit  Name: Simona Garrett Date: 2019   MRN: [id number] Sex: Male   Age: 53 y.o. Ethnicity: Non-/Non    : 1965 Race: Willow Lee is here for Medicare AWV    Screenings for behavioral, psychosocial and functional/safety risks, and cognitive dysfunction are all negative except as indicated below. These results, as well as other patient data from the [address] form, are documented in Flowsheets linked to this Encounter. Allergies   Allergen Reactions    Dicyclomine Hcl     Pcn [Penicillins]     Tolectin [Tolmetin Sodium]        Prior to Visit Medications    Medication Sig Taking? Authorizing Provider   ergocalciferol (ERGOCALCIFEROL) 88401 units capsule Take 1 capsule by mouth once a week Yes Marylen Mcclintock, APRN - CNP   diazepam (VALIUM) 5 MG tablet Take 1 tablet by mouth every 12 hours as needed for Anxiety or Sleep for up to 30 days. Yes Marylen Mcclintock, APRN - CNP   NARCAN 4 MG/0.1ML LIQD nasal spray 4 mg by Nasal route  Historical Provider, MD   triamterene-hydrochlorothiazide (MAXZIDE-25) 37.5-25 MG per tablet Take 1 tablet by mouth daily  Marylen Mcclintock, APRN - CNP   desloratadine (CLARINEX) 5 MG tablet Take 1 tablet by mouth daily  Marylen Mcclintock, APRN - CNP   chlorthalidone (HYGROTON) 25 MG tablet Take 1 tablet by mouth daily  Marylen Mcclintock, APRN - CNP   methylPREDNISolone (MEDROL DOSEPACK) 4 MG tablet Take by mouth 469947 stop  Marylen Mcclintock, APRN - CNP   methylPREDNISolone (MEDROL DOSEPACK) 4 MG tablet Take by mouth [id number] stop  Marylen Mcclintock, APRN - CNP   HYDROcodone-acetaminophen (NORCO)  MG per tablet Take  tablets by mouth every 4-6 hours as needed. Cheryl Bedolla   Historical Provider, MD   lisinopril (PRINIVIL;ZESTRIL) 5 MG tablet Take 1 tablet by mouth 2 times daily  Marylen Mcclintock, APRN - CNP   pravastatin (PRAVACHOL) 20 MG tablet Take 1 tablet by mouth daily  Marylen Mcclintock, APRN - CNP testosterone cypionate (DEPOTESTOTERONE CYPIONATE) 200 MG/ML injection INJECT 2ML EVERY 2 WEEKS  Historical Provider, MD   budesonide-formoterol (SYMBICORT) 160-4.5 MCG/ACT AERO Inhale 2 puffs into the lungs 2 times daily  Historical Provider, MD   VENTOLIN  (90 Base) MCG/ACT inhaler   Historical Provider, MD   ASPIR-LOW 81 MG EC tablet Take 1 tablet by mouth daily  Historical Provider, MD   ipratropium (ATROVENT) 0.03 % nasal spray 1 spray by Nasal route 2 times daily  Historical Provider, MD   omeprazole (PRILOSEC) 40 MG delayed release capsule Take 1 capsule by mouth daily  Historical Provider, MD   montelukast (SINGULAIR) 10 MG tablet Take 10 mg by mouth nightly  Historical Provider, MD   fluticasone (FLONASE) 50 MCG/ACT nasal spray 1 spray by Nasal route daily  ANIYA Snider NP       Past Medical History:   Diagnosis Date    Arthritis     Chronic back pain     r/t arthritis    Depression     Fibromyalgia     Reflux esophagitis     Spinal stenosis      Past Surgical History:   Procedure Laterality Date    CHOLECYSTECTOMY      COLONOSCOPY      SKIN BIOPSY      TONSILLECTOMY         Family History   Problem Relation Age of Onset    Arthritis Mother     Asthma Mother     Heart Disease Mother     High Blood Pressure Mother     Arthritis Father     Heart Disease Father     Depression Sister     Arthritis Sister        CareTeam (Including outside providers/suppliers regularly involved in providing care):   Patient Care Team:  ANIYA Robles CNP as PCP - General (Family Medicine)  ANIYA Robles CNP as PCP - REHABILITATION [facility] Empaneled Provider    Wt Readings from Last 3 Encounters:   09/18/19 271 lb (122.9 kg)   07/18/19 268 lb (121.6 kg)   06/20/19 274 lb (124.3 kg)     Vitals:    09/18/19 0909   BP: 139/78   Pulse: 83   Resp: 20   SpO2: 93%   Weight: 271 lb (122.9 kg)   Height: 6' 1\" (1.854 m)     Body mass index is 35.75 kg/m².     Based upon direct observation of the patient,

## 2019-09-19 ENCOUNTER — TELEPHONE (OUTPATIENT)
Dept: FAMILY MEDICINE CLINIC | Age: 54
End: 2019-09-19

## 2019-09-30 ENCOUNTER — HOSPITAL ENCOUNTER (OUTPATIENT)
Facility: HOSPITAL | Age: 54
Discharge: HOME | End: 2019-09-30
Payer: MEDICARE

## 2019-09-30 DIAGNOSIS — D72.829 LEUKOCYTOSIS, UNSPECIFIED TYPE: Primary | ICD-10-CM

## 2019-09-30 DIAGNOSIS — D72.829 LEUKOCYTOSIS, UNSPECIFIED TYPE: ICD-10-CM

## 2019-09-30 PROCEDURE — 36415 COLL VENOUS BLD VENIPUNCTURE: CPT

## 2019-10-07 DIAGNOSIS — J30.2 SEASONAL ALLERGIES: ICD-10-CM

## 2019-10-07 RX ORDER — DESLORATADINE 5 MG/1
5 TABLET ORAL DAILY
Qty: 90 TABLET | Refills: 1 | Status: SHIPPED | OUTPATIENT
Start: 2019-10-07 | End: 2020-01-03 | Stop reason: SDUPTHER

## 2019-10-16 ENCOUNTER — OFFICE VISIT (OUTPATIENT)
Dept: FAMILY MEDICINE CLINIC | Age: 54
End: 2019-10-16
Payer: MEDICARE

## 2019-10-16 DIAGNOSIS — J20.9 ACUTE BRONCHITIS, UNSPECIFIED ORGANISM: ICD-10-CM

## 2019-10-16 DIAGNOSIS — R05.9 COUGH: ICD-10-CM

## 2019-10-16 DIAGNOSIS — G47.00 INSOMNIA, UNSPECIFIED TYPE: ICD-10-CM

## 2019-10-16 DIAGNOSIS — J40 BRONCHITIS: ICD-10-CM

## 2019-10-16 DIAGNOSIS — F41.9 ANXIETY: ICD-10-CM

## 2019-10-16 PROCEDURE — 3017F COLORECTAL CA SCREEN DOC REV: CPT | Performed by: NURSE PRACTITIONER

## 2019-10-16 PROCEDURE — 4004F PT TOBACCO SCREEN RCVD TLK: CPT | Performed by: NURSE PRACTITIONER

## 2019-10-16 PROCEDURE — G8484 FLU IMMUNIZE NO ADMIN: HCPCS | Performed by: NURSE PRACTITIONER

## 2019-10-16 PROCEDURE — G8427 DOCREV CUR MEDS BY ELIG CLIN: HCPCS | Performed by: NURSE PRACTITIONER

## 2019-10-16 PROCEDURE — G8417 CALC BMI ABV UP PARAM F/U: HCPCS | Performed by: NURSE PRACTITIONER

## 2019-10-16 PROCEDURE — 99213 OFFICE O/P EST LOW 20 MIN: CPT | Performed by: NURSE PRACTITIONER

## 2019-10-16 RX ORDER — HYDROCODONE POLISTIREX AND CHLORPHENIRAMINE POLISTIREX 10; 8 MG/5ML; MG/5ML
5 SUSPENSION, EXTENDED RELEASE ORAL EVERY 12 HOURS PRN
Qty: 115 ML | Refills: 0 | Status: SHIPPED | OUTPATIENT
Start: 2019-10-16 | End: 2019-11-15

## 2019-10-16 RX ORDER — AZITHROMYCIN 250 MG/1
TABLET, FILM COATED ORAL
Qty: 1 PACKET | Refills: 0 | Status: SHIPPED | OUTPATIENT
Start: 2019-10-16 | End: 2019-10-20

## 2019-10-16 RX ORDER — DIAZEPAM 5 MG/1
5 TABLET ORAL EVERY 12 HOURS PRN
Qty: 60 TABLET | Refills: 1 | Status: SHIPPED | OUTPATIENT
Start: 2019-10-16 | End: 2020-01-02 | Stop reason: SDUPTHER

## 2019-10-16 RX ORDER — RANITIDINE 150 MG/1
150 TABLET ORAL 2 TIMES DAILY
COMMUNITY
Start: 2019-10-15 | End: 2019-12-02

## 2019-10-16 RX ORDER — METHYLPREDNISOLONE 4 MG/1
TABLET ORAL
Qty: 21 TABLET | Refills: 0 | Status: SHIPPED | OUTPATIENT
Start: 2019-10-16 | End: 2020-03-03

## 2019-10-22 VITALS
HEART RATE: 89 BPM | SYSTOLIC BLOOD PRESSURE: 128 MMHG | TEMPERATURE: 98.7 F | OXYGEN SATURATION: 97 % | DIASTOLIC BLOOD PRESSURE: 79 MMHG | RESPIRATION RATE: 18 BRPM

## 2019-10-22 ASSESSMENT — ENCOUNTER SYMPTOMS
TROUBLE SWALLOWING: 0
SORE THROAT: 0
DIARRHEA: 0
NAUSEA: 0
SHORTNESS OF BREATH: 0
CHEST TIGHTNESS: 0
EYE PAIN: 0
EYE REDNESS: 0
WHEEZING: 1
ABDOMINAL PAIN: 0
COUGH: 1
VOMITING: 0

## 2019-10-23 ENCOUNTER — TELEPHONE (OUTPATIENT)
Dept: SURGERY | Facility: CLINIC | Age: 54
End: 2019-10-23

## 2019-10-23 NOTE — TELEPHONE ENCOUNTER
Called patient to see if wanted to reschedule his appointment that he canceled via televox with Dr Tomlin. Patient didn't answer left message to call office..

## 2019-12-02 ENCOUNTER — OFFICE VISIT (OUTPATIENT)
Dept: FAMILY MEDICINE CLINIC | Age: 54
End: 2019-12-02
Payer: MEDICARE

## 2019-12-02 VITALS
RESPIRATION RATE: 18 BRPM | HEART RATE: 56 BPM | SYSTOLIC BLOOD PRESSURE: 117 MMHG | OXYGEN SATURATION: 96 % | DIASTOLIC BLOOD PRESSURE: 71 MMHG | TEMPERATURE: 97.8 F

## 2019-12-02 DIAGNOSIS — B96.89 ACUTE BACTERIAL SINUSITIS: Primary | ICD-10-CM

## 2019-12-02 DIAGNOSIS — J01.90 ACUTE BACTERIAL SINUSITIS: Primary | ICD-10-CM

## 2019-12-02 PROCEDURE — 96372 THER/PROPH/DIAG INJ SC/IM: CPT | Performed by: NURSE PRACTITIONER

## 2019-12-02 PROCEDURE — 99213 OFFICE O/P EST LOW 20 MIN: CPT | Performed by: NURSE PRACTITIONER

## 2019-12-02 RX ORDER — OMEPRAZOLE 40 MG/1
40 CAPSULE, DELAYED RELEASE ORAL DAILY
Refills: 6 | COMMUNITY
Start: 2019-11-12

## 2019-12-02 RX ORDER — GUAIFENESIN 600 MG/1
600 TABLET, EXTENDED RELEASE ORAL 2 TIMES DAILY
Qty: 30 TABLET | Refills: 0 | Status: SHIPPED | OUTPATIENT
Start: 2019-12-02 | End: 2019-12-17

## 2019-12-02 RX ORDER — METHYLPREDNISOLONE SODIUM SUCCINATE 40 MG/ML
80 INJECTION, POWDER, LYOPHILIZED, FOR SOLUTION INTRAMUSCULAR; INTRAVENOUS ONCE
Status: COMPLETED | OUTPATIENT
Start: 2019-12-02 | End: 2019-12-02

## 2019-12-02 RX ORDER — AZITHROMYCIN 250 MG/1
250 TABLET, FILM COATED ORAL SEE ADMIN INSTRUCTIONS
Qty: 6 TABLET | Refills: 0 | Status: SHIPPED | OUTPATIENT
Start: 2019-12-02 | End: 2019-12-07

## 2019-12-02 RX ADMIN — METHYLPREDNISOLONE SODIUM SUCCINATE 80 MG: 40 INJECTION, POWDER, LYOPHILIZED, FOR SOLUTION INTRAMUSCULAR; INTRAVENOUS at 10:28

## 2019-12-02 ASSESSMENT — ENCOUNTER SYMPTOMS
COUGH: 1
GASTROINTESTINAL NEGATIVE: 1
ALLERGIC/IMMUNOLOGIC NEGATIVE: 1
EYES NEGATIVE: 1

## 2020-01-02 ENCOUNTER — OFFICE VISIT (OUTPATIENT)
Dept: FAMILY MEDICINE CLINIC | Age: 55
End: 2020-01-02
Payer: MEDICARE

## 2020-01-02 VITALS
HEART RATE: 67 BPM | DIASTOLIC BLOOD PRESSURE: 75 MMHG | RESPIRATION RATE: 18 BRPM | SYSTOLIC BLOOD PRESSURE: 124 MMHG | OXYGEN SATURATION: 91 %

## 2020-01-02 PROCEDURE — G8484 FLU IMMUNIZE NO ADMIN: HCPCS | Performed by: NURSE PRACTITIONER

## 2020-01-02 PROCEDURE — G8417 CALC BMI ABV UP PARAM F/U: HCPCS | Performed by: NURSE PRACTITIONER

## 2020-01-02 PROCEDURE — 99214 OFFICE O/P EST MOD 30 MIN: CPT | Performed by: NURSE PRACTITIONER

## 2020-01-02 PROCEDURE — 4004F PT TOBACCO SCREEN RCVD TLK: CPT | Performed by: NURSE PRACTITIONER

## 2020-01-02 PROCEDURE — G8427 DOCREV CUR MEDS BY ELIG CLIN: HCPCS | Performed by: NURSE PRACTITIONER

## 2020-01-02 PROCEDURE — 3017F COLORECTAL CA SCREEN DOC REV: CPT | Performed by: NURSE PRACTITIONER

## 2020-01-02 RX ORDER — DIAZEPAM 5 MG/1
5 TABLET ORAL EVERY 12 HOURS PRN
Qty: 60 TABLET | Refills: 1 | Status: SHIPPED | OUTPATIENT
Start: 2020-01-02 | End: 2020-02-01

## 2020-01-02 ASSESSMENT — PATIENT HEALTH QUESTIONNAIRE - PHQ9
2. FEELING DOWN, DEPRESSED OR HOPELESS: 1
SUM OF ALL RESPONSES TO PHQ9 QUESTIONS 1 & 2: 2
1. LITTLE INTEREST OR PLEASURE IN DOING THINGS: 1
SUM OF ALL RESPONSES TO PHQ QUESTIONS 1-9: 2
SUM OF ALL RESPONSES TO PHQ QUESTIONS 1-9: 2

## 2020-01-02 ASSESSMENT — ENCOUNTER SYMPTOMS
ABDOMINAL PAIN: 0
SWOLLEN GLANDS: 0
COUGH: 0
VISUAL CHANGE: 0
SHORTNESS OF BREATH: 0
ALLERGIC/IMMUNOLOGIC NEGATIVE: 1
NAUSEA: 0
ABDOMINAL DISTENTION: 1
CHANGE IN BOWEL HABIT: 0
DIARRHEA: 0
SORE THROAT: 0
VOMITING: 0
RESPIRATORY NEGATIVE: 1

## 2020-01-02 NOTE — PATIENT INSTRUCTIONS
Education and Discharge Instructions:  Keep a list of your medicines with you at all times. Always bring a up to date list or the medication bottles when going to the doctor or hospital.   Always follow the directions on your medications unless the doctor or nurse has instructed you otherwise. Keep all medications out of reach of children. Store medicines according to the directions on the label. Seek emergency medical attention if you think you have used too much medication. A overdose can be fatal.  Don't share your medicines with anyone. It may harm them. Discard any unused or out dated medications. If you have any questions, ask your provider or pharmacist for more information. Be sure to keep all appointments for provider visits or tests. Please continue all your medications that the Provider has prescribed for you unless other [facility]    1. Mental Health Info and Treatment [phone]  2. Drug and Alcohol Detox Rehab treatment 24 hr [phone]  3. Stop Smoking Classes- [facility] [place]-[phone]  4. [address] Program- prescription wnceufsjeh-[phone]  5. Hospice Care [phone]  6. Adult/Child Protection [phone]    [facility]: Your online connection to your health information. Download the [employer] at [employer]) or the [employer]	[place] ([employer] [address] from the list of providers. [facility] Help Desk: 9-080-10-QRCSR    [employer]        We are committed to providing you with the best care possible. In order to help us achieve these goals please remember to bring all medications, herbal products, and over the counter supplements with you to each visit. If your provider has ordered testing for you, please be sure to follow up with our office if you have not received results within 7 days after the testing took place.      *If you receive a survey after visiting one of our offices, please take time to share your experience concerning your physician office visit. These surveys are confidential and no health information about you is shared.   We are eager to improve for you and we are counting on your feedback to help make that happen

## 2020-01-03 RX ORDER — DESLORATADINE 5 MG/1
5 TABLET ORAL DAILY
Qty: 90 TABLET | Refills: 1 | Status: SHIPPED | OUTPATIENT
Start: 2020-01-03 | End: 2020-04-01 | Stop reason: SDUPTHER

## 2020-01-07 ENCOUNTER — HOSPITAL ENCOUNTER (OUTPATIENT)
Facility: HOSPITAL | Age: 55
Discharge: HOME | End: 2020-01-07
Payer: MEDICARE

## 2020-01-07 PROCEDURE — 36415 COLL VENOUS BLD VENIPUNCTURE: CPT

## 2020-02-03 ENCOUNTER — OFFICE VISIT (OUTPATIENT)
Dept: FAMILY MEDICINE CLINIC | Age: 55
End: 2020-02-03
Payer: MEDICARE

## 2020-02-03 VITALS
SYSTOLIC BLOOD PRESSURE: 118 MMHG | OXYGEN SATURATION: 94 % | HEART RATE: 82 BPM | RESPIRATION RATE: 18 BRPM | HEIGHT: 73 IN | BODY MASS INDEX: 35.25 KG/M2 | DIASTOLIC BLOOD PRESSURE: 73 MMHG | WEIGHT: 266 LBS

## 2020-02-03 PROCEDURE — 3017F COLORECTAL CA SCREEN DOC REV: CPT | Performed by: NURSE PRACTITIONER

## 2020-02-03 PROCEDURE — 99213 OFFICE O/P EST LOW 20 MIN: CPT | Performed by: NURSE PRACTITIONER

## 2020-02-03 PROCEDURE — G8417 CALC BMI ABV UP PARAM F/U: HCPCS | Performed by: NURSE PRACTITIONER

## 2020-02-03 PROCEDURE — G8484 FLU IMMUNIZE NO ADMIN: HCPCS | Performed by: NURSE PRACTITIONER

## 2020-02-03 PROCEDURE — G8427 DOCREV CUR MEDS BY ELIG CLIN: HCPCS | Performed by: NURSE PRACTITIONER

## 2020-02-03 PROCEDURE — 4004F PT TOBACCO SCREEN RCVD TLK: CPT | Performed by: NURSE PRACTITIONER

## 2020-02-03 RX ORDER — DIAZEPAM 5 MG/1
5 TABLET ORAL EVERY 12 HOURS PRN
Qty: 60 TABLET | Refills: 1 | Status: SHIPPED | OUTPATIENT
Start: 2020-02-03 | End: 2020-03-03 | Stop reason: SDUPTHER

## 2020-02-03 RX ORDER — ONDANSETRON 4 MG/1
4 TABLET, FILM COATED ORAL DAILY PRN
Qty: 30 TABLET | Refills: 0 | Status: SHIPPED | OUTPATIENT
Start: 2020-02-03 | End: 2020-04-01 | Stop reason: SDUPTHER

## 2020-02-03 ASSESSMENT — ENCOUNTER SYMPTOMS
NAUSEA: 0
SHORTNESS OF BREATH: 0
DIARRHEA: 0
COUGH: 0
ABDOMINAL PAIN: 0
VOMITING: 0

## 2020-02-03 NOTE — PROGRESS NOTES
SUBJECTIVE:    Bess Jimenez is a 63 y.o. male    Anxiety: Patient complains of evaluation of anxiety disorder. He has the following anxiety symptoms: anxious, nervous. Onset of symptoms was approximately several years ago, stable since that time. He denies current suicidal and homicidal ideation. Family history significant for anxiety and depression. Possible organic causes contributing are: none. Risk factors: positive family history in  mother and negative life event loss of family members. Previous treatment includes benzodiazepines Valium 5mg BID PRN and none. He complains of the following side effects from the treatment: none.     Lab Results       Component                Value               Date                       CHOL                     148                 01/07/2020                 CHOL                     154                 12/26/2018                 CHOL                     145                 08/21/2015            Lab Results       Component                Value               Date                       TRIG                     291 (H)             01/07/2020                 TRIG                     238 (H)             12/26/2018                 TRIG                     84                  08/21/2015            Lab Results       Component                Value               Date                       HDL                      30 (L)              01/07/2020                 HDL                      33 (L)              12/26/2018                 HDL                      50                  08/21/2015            Lab Results       Component                Value               Date                       LDLCALC                  60                  01/07/2020                 LDLCALC                  73                  12/26/2018                 LDLCALC                  78                  08/21/2015            Lab Results       Component                Value               Date LABVLDL                  58 (H)              01/07/2020                 LABVLDL                  48 (H)              12/26/2018                 LABVLDL                  17                  08/21/2015            Lab Results       Component                Value               Date                       MAURICE             3.4                 08/26/2013              Pt had elevated triglycerides. Pt reports he has been unable to tolerate cholesterol medication in the past. Pt states \"I started taking some cholesterol medicine after they called me\". Pt is unsure the name of the medication and stated \"I'll bring all my medications next visit. \". Pt request Zofran to have on hand for nausea or vomiting. Pt declined symptoms at this time. Pt reports he has taken before and tolerated it well. Chief Complaint   Patient presents with    Anxiety    Medication Refill     HB1 up to date        Review of Systems   Constitutional: Negative. Respiratory: Negative for cough and shortness of breath. Cardiovascular: Negative for chest pain. Gastrointestinal: Negative for abdominal pain, diarrhea, nausea and vomiting. Musculoskeletal: Positive for arthralgias. Psychiatric/Behavioral: The patient is nervous/anxious. OBJECTIVE:    /73   Pulse 82   Resp 18   Ht 6' 1\" (1.854 m)   Wt 266 lb (120.7 kg)   SpO2 94%   BMI 35.09 kg/m²    Physical Exam  Constitutional:       Appearance: He is well-developed. Neck:      Thyroid: No thyromegaly. Vascular: No carotid bruit. Cardiovascular:      Rate and Rhythm: Normal rate and regular rhythm. Heart sounds: Normal heart sounds. No murmur. Pulmonary:      Effort: Pulmonary effort is normal.      Breath sounds: Normal breath sounds. Skin:     General: Skin is warm and dry. Neurological:      Mental Status: He is alert and oriented to person, place, and time.    Psychiatric:         Attention and Perception: Attention and perception normal.         Mood and Affect: Mood and affect normal.         Speech: Speech normal.         Behavior: Behavior normal.         Thought Content: Thought content normal.         Cognition and Memory: Cognition and memory normal.         Judgment: Judgment normal.         ASSESSMENT/PLAN:   Ruben Armenta was seen today for anxiety and medication refill. Diagnoses and all orders for this visit:    Anxiety  -     diazepam (VALIUM) 5 MG tablet; Take 1 tablet by mouth every 12 hours as needed for Anxiety or Sleep for up to 30 days. Insomnia, unspecified type  -     diazepam (VALIUM) 5 MG tablet; Take 1 tablet by mouth every 12 hours as needed for Anxiety or Sleep for up to 30 days. Other orders  -     ondansetron (ZOFRAN) 4 MG tablet; Take 1 tablet by mouth daily as needed for Nausea or Vomiting    Controlled Substance Monitoring:    Acute and Chronic Pain Monitoring:   RX Monitoring 2/3/2020   Attestation -   Acute Pain Prescriptions -   Periodic Controlled Substance Monitoring Possible medication side effects, risk of tolerance/dependence & alternative treatments discussed. ;Assessed functional status. Chronic Pain > 50 MEDD -   Chronic Pain > 80 MEDD -   Chronic Pain > 120 MEDD Obtained or confirmed \"Medication Contract\" on file. Return in about 1 month (around 3/3/2020), or as needed.       Current Outpatient Medications on File Prior to Visit   Medication Sig Dispense Refill    desloratadine (CLARINEX) 5 MG tablet Take 1 tablet by mouth daily 90 tablet 1    omeprazole (PRILOSEC) 40 MG delayed release capsule Take 40 mg by mouth daily  6    methylPREDNISolone (MEDROL DOSEPACK) 4 MG tablet Take by mouth [id number] stop 21 tablet 0    NARCAN 4 MG/0.1ML LIQD nasal spray 4 mg by Nasal route  0    ergocalciferol (ERGOCALCIFEROL) 29936 units capsule Take 1 capsule by mouth once a week 4 capsule 3    triamterene-hydrochlorothiazide (MAXZIDE-25) 37.5-25 MG per tablet Take 1 tablet by mouth daily 30 tablet 11

## 2020-02-12 RX ORDER — LISINOPRIL 5 MG/1
5 TABLET ORAL 2 TIMES DAILY
Qty: 60 TABLET | Refills: 3 | Status: SHIPPED | OUTPATIENT
Start: 2020-02-12 | End: 2020-11-03

## 2020-03-03 ENCOUNTER — OFFICE VISIT (OUTPATIENT)
Dept: FAMILY MEDICINE CLINIC | Age: 55
End: 2020-03-03
Payer: MEDICARE

## 2020-03-03 VITALS
OXYGEN SATURATION: 93 % | BODY MASS INDEX: 35.58 KG/M2 | HEART RATE: 82 BPM | SYSTOLIC BLOOD PRESSURE: 123 MMHG | WEIGHT: 269.7 LBS | DIASTOLIC BLOOD PRESSURE: 76 MMHG

## 2020-03-03 PROCEDURE — 99213 OFFICE O/P EST LOW 20 MIN: CPT | Performed by: NURSE PRACTITIONER

## 2020-03-03 PROCEDURE — 4004F PT TOBACCO SCREEN RCVD TLK: CPT | Performed by: NURSE PRACTITIONER

## 2020-03-03 PROCEDURE — 3017F COLORECTAL CA SCREEN DOC REV: CPT | Performed by: NURSE PRACTITIONER

## 2020-03-03 PROCEDURE — G8484 FLU IMMUNIZE NO ADMIN: HCPCS | Performed by: NURSE PRACTITIONER

## 2020-03-03 PROCEDURE — G8417 CALC BMI ABV UP PARAM F/U: HCPCS | Performed by: NURSE PRACTITIONER

## 2020-03-03 PROCEDURE — G8427 DOCREV CUR MEDS BY ELIG CLIN: HCPCS | Performed by: NURSE PRACTITIONER

## 2020-03-03 RX ORDER — DIAZEPAM 5 MG/1
5 TABLET ORAL EVERY 12 HOURS PRN
Qty: 60 TABLET | Refills: 1 | Status: SHIPPED | OUTPATIENT
Start: 2020-03-03 | End: 2020-04-01 | Stop reason: SDUPTHER

## 2020-03-03 RX ORDER — FENOFIBRATE 48 MG/1
48 TABLET, COATED ORAL DAILY
Qty: 30 TABLET | Refills: 3 | Status: SHIPPED | OUTPATIENT
Start: 2020-03-03

## 2020-03-03 RX ORDER — ERGOCALCIFEROL 1.25 MG/1
50000 CAPSULE ORAL WEEKLY
Qty: 4 CAPSULE | Refills: 5 | Status: SHIPPED | OUTPATIENT
Start: 2020-03-03

## 2020-03-03 RX ORDER — TRIAMTERENE AND HYDROCHLOROTHIAZIDE 37.5; 25 MG/1; MG/1
1 TABLET ORAL DAILY
Qty: 30 TABLET | Refills: 5 | Status: SHIPPED | OUTPATIENT
Start: 2020-03-03 | End: 2020-08-27 | Stop reason: SDUPTHER

## 2020-03-03 RX ORDER — MONTELUKAST SODIUM 10 MG/1
10 TABLET ORAL NIGHTLY
Qty: 30 TABLET | Refills: 5 | Status: SHIPPED | OUTPATIENT
Start: 2020-03-03 | End: 2020-08-27 | Stop reason: SDUPTHER

## 2020-03-03 ASSESSMENT — ENCOUNTER SYMPTOMS
EYES NEGATIVE: 1
GASTROINTESTINAL NEGATIVE: 1
COUGH: 0
ABDOMINAL PAIN: 0
RESPIRATORY NEGATIVE: 1
VOMITING: 0
ALLERGIC/IMMUNOLOGIC NEGATIVE: 1
DIARRHEA: 0
SHORTNESS OF BREATH: 0
NAUSEA: 0

## 2020-03-03 NOTE — PATIENT INSTRUCTIONS
Education and Discharge Instructions:  Keep a list of your medicines with you at all times. Always bring a up to date list or the medication bottles when going to the doctor or hospital.   Always follow the directions on your medications unless the doctor or nurse has instructed you otherwise. Keep all medications out of reach of children. Store medicines according to the directions on the label. Seek emergency medical attention if you think you have used too much medication. A overdose can be fatal.  Don't share your medicines with anyone. It may harm them. Discard any unused or out dated medications. If you have any questions, ask your provider or pharmacist for more information. Be sure to keep all appointments for provider visits or tests. Please continue all your medications that the Provider has prescribed for you unless other [facility]    1. Mental Health Info and Treatment [phone]  2. Drug and Alcohol Detox Rehab treatment 24 hr [id number]-[phone]  3. Stop Smoking Classes- [facility]-[phone]  4. [address] Program- prescription [phone]  5. Hospice Care [id number]-[phone]  6. Adult/Child Protection [phone]          . We are committed to providing you with the best care possible. In order to help us achieve these goals please remember to bring all medications, herbal products, and over the counter supplements with you to each visit. If your provider has ordered testing for you, please be sure to follow up with our office if you have not received results within 7 days after the testing took place. *If you receive a survey after visiting one of our offices, please take time to share your experience concerning your physician office visit. These surveys are confidential and no health information about you is shared.   We are eager to improve for you and we are counting on your

## 2020-03-03 NOTE — PROGRESS NOTES
Have you seen any other physician or provider since your last visit no    Have you had any other diagnostic tests since your last visit? no    Have you changed or stopped any medications since your last visit? no     Goals      Blood Pressure < 140/90         Health Maintenance Due   Topic Date Due    Low dose CT lung screening  01/12/2019    Flu vaccine (1) 09/01/2019    A1C test (Diabetic or Prediabetic)  12/26/2019     Current Outpatient Medications   Medication Sig Dispense Refill    lisinopril (PRINIVIL;ZESTRIL) 5 MG tablet Take 1 tablet by mouth 2 times daily 60 tablet 3    diazepam (VALIUM) 5 MG tablet Take 1 tablet by mouth every 12 hours as needed for Anxiety or Sleep for up to 30 days. 60 tablet 1    ondansetron (ZOFRAN) 4 MG tablet Take 1 tablet by mouth daily as needed for Nausea or Vomiting 30 tablet 0    desloratadine (CLARINEX) 5 MG tablet Take 1 tablet by mouth daily 90 tablet 1    omeprazole (PRILOSEC) 40 MG delayed release capsule Take 40 mg by mouth daily  6    NARCAN 4 MG/0.1ML LIQD nasal spray 4 mg by Nasal route  0    triamterene-hydrochlorothiazide (MAXZIDE-25) 37.5-25 MG per tablet Take 1 tablet by mouth daily 30 tablet 11    HYDROcodone-acetaminophen (NORCO)  MG per tablet Take  tablets by mouth every 4-6 hours as needed. Cyndie Sites 0    testosterone cypionate (DEPOTESTOTERONE CYPIONATE) 200 MG/ML injection INJECT 2ML EVERY 2 WEEKS  5    VENTOLIN  (90 Base) MCG/ACT inhaler   6    montelukast (SINGULAIR) 10 MG tablet Take 10 mg by mouth nightly      fluticasone (FLONASE) 50 MCG/ACT nasal spray 1 spray by Nasal route daily 1 Bottle 3    ergocalciferol (ERGOCALCIFEROL) 27480 units capsule Take 1 capsule by mouth once a week (Patient not taking: Reported on 3/3/2020) 4 capsule 3    ASPIR-LOW 81 MG EC tablet Take 1 tablet by mouth daily  11     No current facility-administered medications for this visit.

## 2020-03-03 NOTE — PROGRESS NOTES
SUBJECTIVE:    Tia Locke is a 63 y.o. male    Anxiety: Patient complains of evaluation of anxiety disorder. He has the following anxiety symptoms: anxious, nervous. Onset of symptoms was approximately several years ago, stable since that time. He denies current suicidal and homicidal ideation. Family history significant for anxiety and depression. Possible organic causes contributing are: none. Risk factors: positive family history in  mother and negative life event loss of family members. Previous treatment includes benzodiazepines Valium 5mg BID PRN and none. He complains of the following side effects from the treatment: none. Pt had elevated triglycerides-291 on 01/07/2020. Pt reported he is taking cholesterol medication last visit but was unsure the name. Pt brought in medications for review today. Pt is not taking cholesterol medications at this time. Pt reports he took Lipitor in the past that caused nausea. Chief Complaint   Patient presents with    Anxiety    Medication Refill     HB1 up to date        Review of Systems   Constitutional: Positive for fatigue (since being out of vitamin D). Negative for activity change, appetite change, chills, diaphoresis, fever and unexpected weight change. HENT: Negative. Eyes: Negative. Respiratory: Negative. Negative for cough and shortness of breath. Cardiovascular: Negative. Negative for chest pain. Gastrointestinal: Negative. Negative for abdominal pain, diarrhea, nausea and vomiting. Endocrine: Negative. Genitourinary: Negative. Musculoskeletal: Negative. Allergic/Immunologic: Negative. Neurological: Negative. Hematological: Negative. Psychiatric/Behavioral: The patient is nervous/anxious (stable on Valium 5 mg q 12 hrs PRN). OBJECTIVE:    /76   Pulse 82   Wt 269 lb 11.2 oz (122.3 kg)   SpO2 93%   BMI 35.58 kg/m²    Physical Exam  Constitutional:       Appearance: He is well-developed. Neck:      Thyroid: No thyromegaly. Vascular: No carotid bruit. Cardiovascular:      Rate and Rhythm: Normal rate and regular rhythm. Heart sounds: Normal heart sounds. No murmur. Pulmonary:      Effort: Pulmonary effort is normal.      Breath sounds: Normal breath sounds. Skin:     General: Skin is warm and dry. Neurological:      Mental Status: He is alert and oriented to person, place, and time. Psychiatric:         Behavior: Behavior normal.         ASSESSMENT/PLAN:   Tiesha Tripp was seen today for anxiety and medication refill. Diagnoses and all orders for this visit:    Hypertriglyceridemia  -     fenofibrate (TRICOR) 48 MG tablet; Take 1 tablet by mouth daily  Recommended a low fat diet and regular physical activity at least 30 days per day 4-5 days per week. Anxiety  -     diazePAM (VALIUM) 5 MG tablet; Take 1 tablet by mouth every 12 hours as needed for Anxiety or Sleep for up to 30 days. Insomnia, unspecified type  -     diazePAM (VALIUM) 5 MG tablet; Take 1 tablet by mouth every 12 hours as needed for Anxiety or Sleep for up to 30 days. Essential hypertension  -     triamterene-hydrochlorothiazide (MAXZIDE-25) 37.5-25 MG per tablet; Take 1 tablet by mouth daily    Other orders  -     montelukast (SINGULAIR) 10 MG tablet; Take 1 tablet by mouth nightly        Return in about 1 month (around 4/3/2020), or if symptoms worsen or fail to improve.     Current Outpatient Medications on File Prior to Visit   Medication Sig Dispense Refill    lisinopril (PRINIVIL;ZESTRIL) 5 MG tablet Take 1 tablet by mouth 2 times daily 60 tablet 3    ondansetron (ZOFRAN) 4 MG tablet Take 1 tablet by mouth daily as needed for Nausea or Vomiting 30 tablet 0    desloratadine (CLARINEX) 5 MG tablet Take 1 tablet by mouth daily 90 tablet 1    omeprazole (PRILOSEC) 40 MG delayed release capsule Take 40 mg by mouth daily  6    NARCAN 4 MG/0.1ML LIQD nasal spray 4 mg by Nasal route  0   

## 2020-03-30 ENCOUNTER — TELEPHONE (OUTPATIENT)
Dept: FAMILY MEDICINE CLINIC | Age: 55
End: 2020-03-30

## 2020-04-01 ENCOUNTER — VIRTUAL VISIT (OUTPATIENT)
Dept: FAMILY MEDICINE CLINIC | Age: 55
End: 2020-04-01
Payer: MEDICARE

## 2020-04-01 PROCEDURE — G2025 DIS SITE TELE SVCS RHC/FQHC: HCPCS | Performed by: NURSE PRACTITIONER

## 2020-04-01 RX ORDER — DIAZEPAM 5 MG/1
5 TABLET ORAL EVERY 12 HOURS PRN
Qty: 60 TABLET | Refills: 1 | Status: SHIPPED | OUTPATIENT
Start: 2020-04-01 | End: 2020-07-02 | Stop reason: SDUPTHER

## 2020-04-01 RX ORDER — DESLORATADINE 5 MG/1
5 TABLET ORAL DAILY
Qty: 90 TABLET | Refills: 1 | Status: SHIPPED | OUTPATIENT
Start: 2020-04-01 | End: 2020-07-17 | Stop reason: SDUPTHER

## 2020-04-01 RX ORDER — ONDANSETRON 4 MG/1
4 TABLET, FILM COATED ORAL DAILY PRN
Qty: 30 TABLET | Refills: 0 | Status: SHIPPED | OUTPATIENT
Start: 2020-04-01 | End: 2020-06-04 | Stop reason: SDUPTHER

## 2020-04-01 ASSESSMENT — ENCOUNTER SYMPTOMS
DIARRHEA: 0
EYES NEGATIVE: 1
ABDOMINAL PAIN: 0
SHORTNESS OF BREATH: 0
COUGH: 0
RESPIRATORY NEGATIVE: 1
ALLERGIC/IMMUNOLOGIC NEGATIVE: 1
NAUSEA: 0
VOMITING: 0

## 2020-04-01 NOTE — PROGRESS NOTES
SUBJECTIVE:    Jamaal Ramirez is a 63 y.o. male    Telephone visit for chronic Anxiety. Patient complains of evaluation of anxiety disorder. He has the following anxiety symptoms: anxious, nervous. Onset of symptoms was approximately several years ago, stable since that time. He denies current suicidal and homicidal ideation. Family history significant for anxiety and depression. Possible organic causes contributing are: none. Risk factors: positive family history in  mother and negative life event loss of family members. Previous treatment includes benzodiazepines Valium 5mg BID PRN and none. He complains of the following side effects from the treatment: none  Pt reports no increased anxiety due to COVID 19 pandemic. Pt feels he is doing well and staying home. Pt reports he misplaced his Zofran. Pt denies nausea/vomiting. Pt likes to keep Zofran on hand for nausea as needed. Pt also requested a refill on desloratadine 5mg daily for chronic allergies. Pt reports allergies are managed well with current treatment. Chief Complaint   Patient presents with    Anxiety     medication refill- valium 5 mg BID        Review of Systems   Constitutional: Negative. HENT: Negative. Eyes: Negative. Respiratory: Negative. Negative for cough and shortness of breath. Cardiovascular: Negative for chest pain. Gastrointestinal: Negative for abdominal pain, diarrhea, nausea and vomiting. Endocrine: Negative. Genitourinary: Negative. Musculoskeletal: Negative. Skin: Negative. Allergic/Immunologic: Negative. Neurological: Negative. Hematological: Negative. Psychiatric/Behavioral: The patient is nervous/anxious (stable at this time.). OBJECTIVE:    There were no vitals taken for this visit.    Physical Exam  Pulmonary:      Effort: Pulmonary effort is normal.      Comments: Pt voice is clear without s/s of increased work of breathing  Neurological:      Mental route  0    HYDROcodone-acetaminophen (NORCO)  MG per tablet Take  tablets by mouth every 4-6 hours as needed. Porfirio Reagan 0    testosterone cypionate (DEPOTESTOTERONE CYPIONATE) 200 MG/ML injection INJECT 2ML EVERY 2 WEEKS  5    VENTOLIN  (90 Base) MCG/ACT inhaler   6    ASPIR-LOW 81 MG EC tablet Take 1 tablet by mouth daily  11    fluticasone (FLONASE) 50 MCG/ACT nasal spray 1 spray by Nasal route daily 1 Bottle 3     No current facility-administered medications on file prior to visit.

## 2020-06-03 ENCOUNTER — TELEPHONE (OUTPATIENT)
Dept: FAMILY MEDICINE CLINIC | Age: 55
End: 2020-06-03

## 2020-06-04 ENCOUNTER — HOSPITAL ENCOUNTER (OUTPATIENT)
Facility: HOSPITAL | Age: 55
Discharge: HOME | End: 2020-06-04
Payer: MEDICARE

## 2020-06-04 PROCEDURE — 36415 COLL VENOUS BLD VENIPUNCTURE: CPT

## 2020-06-04 RX ORDER — ONDANSETRON 4 MG/1
4 TABLET, FILM COATED ORAL DAILY PRN
Qty: 30 TABLET | Refills: 0 | Status: SHIPPED | OUTPATIENT
Start: 2020-06-04 | End: 2020-07-02 | Stop reason: SDUPTHER

## 2020-07-02 ENCOUNTER — HOSPITAL ENCOUNTER (OUTPATIENT)
Facility: HOSPITAL | Age: 55
Discharge: HOME | End: 2020-07-02
Payer: MEDICARE

## 2020-07-02 ENCOUNTER — OFFICE VISIT (OUTPATIENT)
Dept: FAMILY MEDICINE CLINIC | Age: 55
End: 2020-07-02
Payer: MEDICARE

## 2020-07-02 VITALS
DIASTOLIC BLOOD PRESSURE: 77 MMHG | SYSTOLIC BLOOD PRESSURE: 115 MMHG | HEART RATE: 84 BPM | BODY MASS INDEX: 33.99 KG/M2 | TEMPERATURE: 97.9 F | OXYGEN SATURATION: 96 % | WEIGHT: 257.6 LBS

## 2020-07-02 PROCEDURE — G8417 CALC BMI ABV UP PARAM F/U: HCPCS | Performed by: NURSE PRACTITIONER

## 2020-07-02 PROCEDURE — 4004F PT TOBACCO SCREEN RCVD TLK: CPT | Performed by: NURSE PRACTITIONER

## 2020-07-02 PROCEDURE — 99214 OFFICE O/P EST MOD 30 MIN: CPT | Performed by: NURSE PRACTITIONER

## 2020-07-02 PROCEDURE — G8427 DOCREV CUR MEDS BY ELIG CLIN: HCPCS | Performed by: NURSE PRACTITIONER

## 2020-07-02 PROCEDURE — 36415 COLL VENOUS BLD VENIPUNCTURE: CPT

## 2020-07-02 PROCEDURE — 3017F COLORECTAL CA SCREEN DOC REV: CPT | Performed by: NURSE PRACTITIONER

## 2020-07-02 RX ORDER — ONDANSETRON 4 MG/1
4 TABLET, FILM COATED ORAL DAILY PRN
Qty: 30 TABLET | Refills: 5 | Status: SHIPPED | OUTPATIENT
Start: 2020-07-02

## 2020-07-02 RX ORDER — DIAZEPAM 5 MG/1
5 TABLET ORAL EVERY 12 HOURS PRN
Qty: 60 TABLET | Refills: 0 | Status: SHIPPED | OUTPATIENT
Start: 2020-07-02 | End: 2020-09-03 | Stop reason: SDUPTHER

## 2020-07-02 RX ORDER — DIAZEPAM 5 MG/1
5 TABLET ORAL EVERY 12 HOURS PRN
Qty: 60 TABLET | Refills: 1 | Status: SHIPPED | OUTPATIENT
Start: 2020-07-02 | End: 2020-07-02

## 2020-07-02 ASSESSMENT — ENCOUNTER SYMPTOMS
SHORTNESS OF BREATH: 0
ORTHOPNEA: 0
COUGH: 0
VOMITING: 0
DIARRHEA: 0
BLURRED VISION: 0
ABDOMINAL PAIN: 0
NAUSEA: 0

## 2020-07-02 NOTE — PROGRESS NOTES
Have you seen any other physician or provider since your last visit no    Have you had any other diagnostic tests since your last visit? no    Have you changed or stopped any medications since your last visit? No       Recent Travel Screening and Travel History documentation:     Travel Screening       Question Response     Do you have any of the following symptoms? None of these     In the last month, have you been in contact with someone who was confirmed or suspected to have Coronavirus / COVID-19? No / Unsure     Have you traveled internationally in the last month?        Travel History   Travel since 06/02/20     No documented travel since 06/02/20

## 2020-07-02 NOTE — PROGRESS NOTES
SUBJECTIVE:    Alverto Flores is a 63 y.o. male    1 month follow up for medication refills. Pt request a refill on Valium 5mg q 12 hrs PRN. He has the following anxiety symptoms: anxious, nervous. Onset of symptoms was approximately several years ago, stable since that time. He denies current suicidal and homicidal ideation. Family history significant for anxiety and depression. Possible organic causes contributing are: none. Risk factors: positive family history in  mother and negative life event loss of family members. Previous treatment includes benzodiazepines Valium 5mg BID PRN and none. He complains of the following side effects from the treatment: none   . Pt request a refill on Zofran. Pt denies nausea/vomiting. Pt likes to keep Zofran on hand for nausea as needed. Pt states \"I lose more of these than I actually take\"     Hypertension   This is a chronic problem. The current episode started more than 1 year ago. The problem is controlled. Pertinent negatives include no anxiety, blurred vision, chest pain, headaches, malaise/fatigue, neck pain, orthopnea, palpitations, peripheral edema, PND, shortness of breath or sweats. There are no associated agents to hypertension. Risk factors for coronary artery disease include smoking/tobacco exposure, sedentary lifestyle, stress and dyslipidemia. Past treatments include ACE inhibitors. The current treatment provides significant improvement. There are no compliance problems. There is no history of chronic renal disease. Hyperlipidemia   This is a chronic problem. The current episode started more than 1 year ago. He has no history of chronic renal disease, diabetes, hypothyroidism, liver disease, obesity or nephrotic syndrome. Pertinent negatives include no chest pain or shortness of breath. Current antihyperlipidemic treatment includes fibric acid derivatives. Compliance problems include adherence to exercise and adherence to diet.          Chief Complaint   Patient presents with    Anxiety    Medication Refill     UDS        Review of Systems   Constitutional: Negative. Negative for malaise/fatigue. Eyes: Negative for blurred vision. Respiratory: Negative for cough and shortness of breath. Cardiovascular: Negative for chest pain, palpitations, orthopnea and PND. Gastrointestinal: Negative for abdominal pain, diarrhea, nausea and vomiting. Musculoskeletal: Negative for neck pain. Neurological: Negative for headaches. Psychiatric/Behavioral: The patient is nervous/anxious. OBJECTIVE:    /77   Pulse 84   Temp 97.9 °F (36.6 °C) (Oral)   Wt 257 lb 9.6 oz (116.8 kg)   SpO2 96%   BMI 33.99 kg/m²    Physical Exam  Vitals signs and nursing note reviewed. Constitutional:       Appearance: He is well-developed. Neck:      Thyroid: No thyromegaly. Vascular: No carotid bruit. Cardiovascular:      Rate and Rhythm: Normal rate and regular rhythm. Heart sounds: Normal heart sounds. No murmur. Pulmonary:      Effort: Pulmonary effort is normal.      Breath sounds: Normal breath sounds. Skin:     General: Skin is warm and dry. Neurological:      Mental Status: He is alert and oriented to person, place, and time. Psychiatric:         Attention and Perception: Attention and perception normal.         Mood and Affect: Mood and affect normal.         Speech: Speech normal.         Behavior: Behavior normal.         Thought Content: Thought content normal.         Cognition and Memory: Cognition normal.         Judgment: Judgment normal.         ASSESSMENT/PLAN:   Jess Granados was seen today for anxiety and medication refill. Diagnoses and all orders for this visit:    Essential hypertension    Anxiety  -     Discontinue: diazePAM (VALIUM) 5 MG tablet; Take 1 tablet by mouth every 12 hours as needed for Anxiety or Sleep for up to 30 days. -     diazePAM (VALIUM) 5 MG tablet;  Take 1 tablet by mouth every 12 hours as needed for Anxiety or Sleep for up to 30 days. Insomnia, unspecified type  -     Discontinue: diazePAM (VALIUM) 5 MG tablet; Take 1 tablet by mouth every 12 hours as needed for Anxiety or Sleep for up to 30 days. -     diazePAM (VALIUM) 5 MG tablet; Take 1 tablet by mouth every 12 hours as needed for Anxiety or Sleep for up to 30 days. Mixed hyperlipidemia    Other orders  -     ondansetron (ZOFRAN) 4 MG tablet; Take 1 tablet by mouth daily as needed for Nausea or Vomiting      Controlled Substance Monitoring:    Acute and Chronic Pain Monitoring:   RX Monitoring 7/2/2020   Attestation -   Acute Pain Prescriptions -   Periodic Controlled Substance Monitoring Random urine drug screen sent today. ;Possible medication side effects, risk of tolerance/dependence & alternative treatments discussed. ;No signs of potential drug abuse or diversion identified. ;Assessed functional status. Chronic Pain > 50 MEDD -   Chronic Pain > 80 MEDD -   Chronic Pain > 120 MEDD Obtained or confirmed \"Medication Contract\" on file. Return in about 1 month (around 8/2/2020), or if symptoms worsen or fail to improve.       Current Outpatient Medications on File Prior to Visit   Medication Sig Dispense Refill    desloratadine (CLARINEX) 5 MG tablet Take 1 tablet by mouth daily 90 tablet 1    triamterene-hydrochlorothiazide (MAXZIDE-25) 37.5-25 MG per tablet Take 1 tablet by mouth daily 30 tablet 5    montelukast (SINGULAIR) 10 MG tablet Take 1 tablet by mouth nightly 30 tablet 5    fenofibrate (TRICOR) 48 MG tablet Take 1 tablet by mouth daily 30 tablet 3    vitamin D (ERGOCALCIFEROL) 1.25 MG (33578 UT) CAPS capsule Take 1 capsule by mouth once a week 4 capsule 5    lisinopril (PRINIVIL;ZESTRIL) 5 MG tablet Take 1 tablet by mouth 2 times daily 60 tablet 3    omeprazole (PRILOSEC) 40 MG delayed release capsule Take 40 mg by mouth daily  6    NARCAN 4 MG/0.1ML LIQD nasal spray 4 mg by Nasal route  0    HYDROcodone-acetaminophen (NORCO)  MG per tablet Take  tablets by mouth every 4-6 hours as needed. Jai Maria Eugenia 0    testosterone cypionate (DEPOTESTOTERONE CYPIONATE) 200 MG/ML injection INJECT 2ML EVERY 2 WEEKS  5    VENTOLIN  (90 Base) MCG/ACT inhaler   6    ASPIR-LOW 81 MG EC tablet Take 1 tablet by mouth daily  11    fluticasone (FLONASE) 50 MCG/ACT nasal spray 1 spray by Nasal route daily 1 Bottle 3     No current facility-administered medications on file prior to visit.

## 2020-07-17 RX ORDER — DESLORATADINE 5 MG/1
5 TABLET ORAL DAILY
Qty: 90 TABLET | Refills: 1 | Status: SHIPPED | OUTPATIENT
Start: 2020-07-17 | End: 2021-01-13

## 2020-07-21 ENCOUNTER — OFFICE VISIT (OUTPATIENT)
Dept: FAMILY MEDICINE CLINIC | Age: 55
End: 2020-07-21
Payer: MEDICARE

## 2020-07-21 ENCOUNTER — HOSPITAL ENCOUNTER (OUTPATIENT)
Facility: HOSPITAL | Age: 55
Discharge: HOME | End: 2020-07-21
Payer: MEDICARE

## 2020-07-21 PROCEDURE — G8417 CALC BMI ABV UP PARAM F/U: HCPCS | Performed by: NURSE PRACTITIONER

## 2020-07-21 PROCEDURE — 99213 OFFICE O/P EST LOW 20 MIN: CPT | Performed by: NURSE PRACTITIONER

## 2020-07-21 PROCEDURE — 36415 COLL VENOUS BLD VENIPUNCTURE: CPT

## 2020-07-21 PROCEDURE — 3017F COLORECTAL CA SCREEN DOC REV: CPT | Performed by: NURSE PRACTITIONER

## 2020-07-21 PROCEDURE — 4004F PT TOBACCO SCREEN RCVD TLK: CPT | Performed by: NURSE PRACTITIONER

## 2020-07-21 PROCEDURE — G8427 DOCREV CUR MEDS BY ELIG CLIN: HCPCS | Performed by: NURSE PRACTITIONER

## 2020-07-21 PROCEDURE — G0296 VISIT TO DETERM LDCT ELIG: HCPCS | Performed by: NURSE PRACTITIONER

## 2020-07-21 ASSESSMENT — ENCOUNTER SYMPTOMS
SHORTNESS OF BREATH: 0
VOMITING: 0
EYES NEGATIVE: 1
NAUSEA: 0
ABDOMINAL PAIN: 0
ALLERGIC/IMMUNOLOGIC NEGATIVE: 1
DIARRHEA: 0

## 2020-07-21 NOTE — PROGRESS NOTES
Have you seen any other physician or provider since your last visit no    Have you had any other diagnostic tests since your last visit? no    Have you changed or stopped any medications since your last visit?  No

## 2020-07-21 NOTE — PATIENT INSTRUCTIONS
Education and Discharge Instructions:  Keep a list of your medicines with you at all times. Always bring a up to date list or the medication bottles when going to the doctor or hospital.   Always follow the directions on your medications unless the doctor or nurse has instructed you otherwise. Keep all medications out of reach of children. Store medicines according to the directions on the label. Seek emergency medical attention if you think you have used too much medication. A overdose can be fatal.  Don't share your medicines with anyone. It may harm them. Discard any unused or out dated medications. If you have any questions, ask your provider or pharmacist for more information. Be sure to keep all appointments for provider visits or tests. Please continue all your medications that the Provider has prescribed for you unless other [facility]    1. Mental Health Info and Treatment Dbskakx-[phone]  2. Drug and Alcohol Detox Rehab treatment 24 hr kjwwirnk-[phone]  3. [facility]- [facility][phone]  4. [address] [facility]- prescription mmlrchgkox-[phone]  5. Hospice Care [facility]-[phone]  6. Adult/Child Protection GIXVBGNX-[phone]          . We are committed to providing you with the best care possible. In order to help us achieve these goals please remember to bring all medications, herbal products, and over the counter supplements with you to each visit. If your provider has ordered testing for you, please be sure to follow up with our office if you have not received results within 7 days after the testing took place. *If you receive a survey after visiting one of our offices, please take time to share your experience concerning your physician office visit. These surveys are confidential and no health information about you is shared.   We are eager to improve for you and we are counting on your feedback to help make that happen    What is lung cancer screening? Lung cancer screening is a way in which doctors check the lungs for early signs of cancer in people who have no symptoms of lung cancer. A low-dose CT scan uses much less radiation than a normal CT scan and shows a more detailed image of the lungs than a standard X-ray. The goal of lung cancer screening is to find cancer early, before it has a chance to grow, spread, or cause problems. One large study found that smokers who were screened with low-dose CT scans were less likely to die of lung cancer than those who were screened with standard X-ray. Below is a summary of the things you need to know regarding screening for lung cancer with low-dose computed tomography (LDCT). This is a screening program that involves routine annual screening with LDCT studies of the lung. The LDCTs are done using low-dose radiation that is not thought to increase your cancer risk. If you have other serious medical conditions (other cancers, congestive heart failure) that limit your life expectancy to less than 10 years, you should not undergo lung cancer screening with LDCT. The chance is 20%-60% that the LDCT result will show abnormalities. This would require additional testing which could include repeat imaging or even invasive procedures. Most (about 95%) of \"abnormal\" LDCT results are false in the sense that no lung cancer is ultimately found. Additionally, some (about 10%) of the cancers found would not affect your life expectancy, even if undetected and untreated. If you are still smoking, the single most important thing that you can do to reduce your risk of dying of lung cancer is to quit. For this screening to be covered by Medicare and most other insurers, strict criteria must be met.   If you do not meet these criteria, but still wish to undergo LDCT testing, you will be required to sign a waiver indicating your willingness to pay for the

## 2020-07-21 NOTE — PROGRESS NOTES
SUBJECTIVE:    Kia Patel is a 63 y.o. male    Pt has temporary medicaid at this time. Pt reports he was previously ordered a sleep study with Dr Leonor Vega but he cancelled due to the cost of the study. Pt would like to get this rescheduled. Pt c/o excessive daytime sleepiness and snoring. Pt states he does not rest well at night. He also presents for re evaluation of elevated WBCs. Pt reports he has had elevated WBCs intermittently over the past few years. Pt reports he has frequent episodes of congestion and allergy symptoms. Pt request a lung cancer screening at this time. Denies weight loss. Pt has chronic cough but he is a heavy smoker, at least 1 ppd for the past 40 years. Lab Results       Component                Value               Date                       WBC                      13.7 (H)            07/02/2020                 HGB                      17.5                07/02/2020                 HCT                      50.3                07/02/2020                 MCV                      88                  07/02/2020                 PLT                      341                 07/02/2020                   Chief Complaint   Patient presents with    Abnormal Lab     elevated WBC    Other     pt would like to have a sleep study re ordered. was previously ordered by ANIYA Smith but it was not covered by insurance. Review of Systems   Constitutional: Negative. Negative for activity change, appetite change, chills, diaphoresis, fatigue, fever and unexpected weight change. HENT: Congestion: chronic- intermittent. Eyes: Negative. Respiratory: Positive for cough (chronic) and wheezing (sometimes). Negative for shortness of breath. Cardiovascular: Negative for chest pain. Gastrointestinal: Negative for abdominal pain, diarrhea, nausea and vomiting. Endocrine: Negative. Negative for cold intolerance, heat intolerance, polydipsia, polyphagia and polyuria. Genitourinary: Negative. Musculoskeletal: Negative. Skin: Negative. Allergic/Immunologic: Negative. Neurological: Negative. Hematological: Negative. Psychiatric/Behavioral: The patient is nervous/anxious. OBJECTIVE:    /73   Pulse 87   Resp 20   Wt 256 lb 8 oz (116.3 kg)   SpO2 97%   BMI 33.84 kg/m²    Physical Exam  Vitals signs and nursing note reviewed. Constitutional:       General: He is not in acute distress. Appearance: Normal appearance. He is well-developed. He is obese. He is not ill-appearing, toxic-appearing or diaphoretic. Comments: BMI 33     HENT:      Head: Normocephalic. Right Ear: Tympanic membrane, ear canal and external ear normal.      Left Ear: Tympanic membrane, ear canal and external ear normal.      Nose: Nose normal.      Right Sinus: No maxillary sinus tenderness or frontal sinus tenderness. Left Sinus: No maxillary sinus tenderness or frontal sinus tenderness. Mouth/Throat:      Pharynx: Uvula midline. No oropharyngeal exudate or posterior oropharyngeal erythema. Neck:      Thyroid: No thyromegaly. Vascular: No carotid bruit. Cardiovascular:      Rate and Rhythm: Normal rate and regular rhythm. Heart sounds: Normal heart sounds. No murmur. Pulmonary:      Effort: Pulmonary effort is normal.      Breath sounds: Normal breath sounds. Lymphadenopathy:      Head:      Right side of head: No submental, submandibular, tonsillar, preauricular, posterior auricular or occipital adenopathy. Left side of head: No submental, submandibular, tonsillar, preauricular, posterior auricular or occipital adenopathy. Cervical: No cervical adenopathy. Skin:     General: Skin is warm and dry. Neurological:      Mental Status: He is alert and oriented to person, place, and time. Psychiatric:         Behavior: Behavior normal.         ASSESSMENT/PLAN:   Sada Benson was seen today for abnormal lab and other.     Diagnoses and all orders for this visit:    Leukocytosis, unspecified type  -     CBC Auto Differential; Future    Tobacco abuse  -     CT LUNG SCREENING; Future    Personal history of nicotine dependence   -     CT LUNG SCREENING; Future    Personal history of tobacco use  -     CT VISIT TO DISCUSS LUNG CA SCREEN W LDCT  -     CT Lung Screen (Annual); Future    Snoring  -     Baseline Diagnostic Sleep Study; Future    Excessive daytime sleepiness  -     Baseline Diagnostic Sleep Study; Future    Other fatigue  -     Baseline Diagnostic Sleep Study; Future        No follow-ups on file. Current Outpatient Medications on File Prior to Visit   Medication Sig Dispense Refill    desloratadine (CLARINEX) 5 MG tablet Take 1 tablet by mouth daily 90 tablet 1    diazePAM (VALIUM) 5 MG tablet Take 1 tablet by mouth every 12 hours as needed for Anxiety or Sleep for up to 30 days. 60 tablet 0    ondansetron (ZOFRAN) 4 MG tablet Take 1 tablet by mouth daily as needed for Nausea or Vomiting 30 tablet 5    triamterene-hydrochlorothiazide (MAXZIDE-25) 37.5-25 MG per tablet Take 1 tablet by mouth daily 30 tablet 5    montelukast (SINGULAIR) 10 MG tablet Take 1 tablet by mouth nightly 30 tablet 5    fenofibrate (TRICOR) 48 MG tablet Take 1 tablet by mouth daily 30 tablet 3    vitamin D (ERGOCALCIFEROL) 1.25 MG (53487 UT) CAPS capsule Take 1 capsule by mouth once a week 4 capsule 5    lisinopril (PRINIVIL;ZESTRIL) 5 MG tablet Take 1 tablet by mouth 2 times daily 60 tablet 3    omeprazole (PRILOSEC) 40 MG delayed release capsule Take 40 mg by mouth daily  6    NARCAN 4 MG/0.1ML LIQD nasal spray 4 mg by Nasal route  0    HYDROcodone-acetaminophen (NORCO)  MG per tablet Take  tablets by mouth every 4-6 hours as needed. Cherelle Long   0    testosterone cypionate (DEPOTESTOTERONE CYPIONATE) 200 MG/ML injection INJECT 2ML EVERY 2 WEEKS  5    VENTOLIN  (90 Base) MCG/ACT inhaler   6    ASPIR-LOW 81 MG EC tablet Take 1 tablet by mouth daily  11    fluticasone (FLONASE) 50 MCG/ACT nasal spray 1 spray by Nasal route daily 1 Bottle 3     No current facility-administered medications on file prior to visit. Low Dose CT (LDCT) Lung Screening criteria met   Age 50-69   Pack year smoking >30   Still smoking or less than 15 year since quit   No sign or symptoms of lung cancer   > 11 months since last LDCT     Risks and benefits of lung cancer screening with LDCT scans discussed:    Significance of positive screen - False-positive LDCT results often occur. 95% of all positive results do not lead to a diagnosis of cancer. Usually further imaging can resolve most false-positive results; however, some patients may require invasive procedures. Over diagnosis risk - 10% to 12% of screen-detected lung cancer cases are over diagnosed--that is, the cancer would not have been detected in the patient's lifetime without the screening. Need for follow up screens annually to continue lung cancer screening effectiveness     Risks associated with radiation from annual LDCT- Radiation exposure is about the same as for a mammogram, which is about 1/3 of the annual background radiation exposure from everyday life. Starting screening at age 54 is not likely to increase cancer risk from radiation exposure. Patients with comorbidities resulting in life expectancy of < 10 years, or that would preclude treatment of an abnormality identified on CT, should not be screened due to lack of benefit.     To obtain maximal benefit from this screening, smoking cessation and long-term abstinence from smoking is critical

## 2020-07-22 PROCEDURE — 36415 COLL VENOUS BLD VENIPUNCTURE: CPT

## 2020-07-23 VITALS
SYSTOLIC BLOOD PRESSURE: 120 MMHG | DIASTOLIC BLOOD PRESSURE: 73 MMHG | HEART RATE: 87 BPM | OXYGEN SATURATION: 97 % | BODY MASS INDEX: 33.84 KG/M2 | RESPIRATION RATE: 20 BRPM | WEIGHT: 256.5 LBS

## 2020-07-23 ASSESSMENT — ENCOUNTER SYMPTOMS
COUGH: 1
WHEEZING: 1

## 2020-08-27 RX ORDER — MONTELUKAST SODIUM 10 MG/1
10 TABLET ORAL NIGHTLY
Qty: 30 TABLET | Refills: 5 | Status: SHIPPED | OUTPATIENT
Start: 2020-08-27 | End: 2020-11-03

## 2020-08-27 RX ORDER — TRIAMTERENE AND HYDROCHLOROTHIAZIDE 37.5; 25 MG/1; MG/1
1 TABLET ORAL DAILY
Qty: 30 TABLET | Refills: 5 | Status: SHIPPED | OUTPATIENT
Start: 2020-08-27

## 2020-09-02 ENCOUNTER — HOSPITAL ENCOUNTER (OUTPATIENT)
Dept: SLEEP CENTER | Facility: HOSPITAL | Age: 55
Discharge: HOME | End: 2020-09-04
Payer: MEDICARE

## 2020-09-02 PROCEDURE — 95806 SLEEP STUDY UNATT&RESP EFFT: CPT

## 2020-09-03 ENCOUNTER — HOSPITAL ENCOUNTER (OUTPATIENT)
Dept: CT IMAGING | Facility: HOSPITAL | Age: 55
Discharge: HOME | End: 2020-09-03
Payer: MEDICARE

## 2020-09-03 ENCOUNTER — HOSPITAL ENCOUNTER (OUTPATIENT)
Dept: RESPIRATORY THERAPY | Facility: HOSPITAL | Age: 55
Discharge: HOME | End: 2020-09-03
Payer: MEDICARE

## 2020-09-03 ENCOUNTER — HOSPITAL ENCOUNTER (OUTPATIENT)
Facility: HOSPITAL | Age: 55
Discharge: HOME | End: 2020-09-03
Payer: MEDICARE

## 2020-09-03 LAB
SARS-COV-2, NAAT: NOT DETECTED
SARS-COV-2, PCR: NORMAL

## 2020-09-03 PROCEDURE — 94729 DIFFUSING CAPACITY: CPT

## 2020-09-03 PROCEDURE — G0297 LDCT FOR LUNG CA SCREEN: HCPCS

## 2020-09-03 PROCEDURE — U0003 INFECTIOUS AGENT DETECTION BY NUCLEIC ACID (DNA OR RNA); SEVERE ACUTE RESPIRATORY SYNDROME CORONAVIRUS 2 (SARS-COV-2) (CORONAVIRUS DISEASE [COVID-19]), AMPLIFIED PROBE TECHNIQUE, MAKING USE OF HIGH THROUGHPUT TECHNOLOGIES AS DESCRIBED BY CMS-2020-01-R: HCPCS

## 2020-09-03 PROCEDURE — U0002 COVID-19 LAB TEST NON-CDC: HCPCS

## 2020-09-03 PROCEDURE — 94618 PULMONARY STRESS TESTING: CPT

## 2020-09-03 PROCEDURE — 94060 EVALUATION OF WHEEZING: CPT

## 2020-09-03 PROCEDURE — 6370000000 HC RX 637 (ALT 250 FOR IP): Performed by: INTERNAL MEDICINE

## 2020-09-03 PROCEDURE — 94726 PLETHYSMOGRAPHY LUNG VOLUMES: CPT

## 2020-09-03 RX ORDER — DIAZEPAM 5 MG/1
5 TABLET ORAL EVERY 12 HOURS PRN
Qty: 60 TABLET | Refills: 0 | Status: SHIPPED | OUTPATIENT
Start: 2020-09-03 | End: 2020-10-05 | Stop reason: SDUPTHER

## 2020-09-03 RX ORDER — ALBUTEROL SULFATE 90 UG/1
2 AEROSOL, METERED RESPIRATORY (INHALATION) ONCE
Status: COMPLETED | OUTPATIENT
Start: 2020-09-03 | End: 2020-09-03

## 2020-09-03 RX ADMIN — Medication 2 PUFF: at 10:39

## 2020-09-14 ENCOUNTER — HOSPITAL ENCOUNTER (OUTPATIENT)
Facility: HOSPITAL | Age: 55
Discharge: HOME | End: 2020-09-16
Payer: MEDICARE

## 2020-09-14 PROCEDURE — 95806 SLEEP STUDY UNATT&RESP EFFT: CPT

## 2020-09-14 NOTE — PROGRESS NOTES
Health Maintenance Due This Visit   Colonoscopy No   Mammogram No   Annual Wellness Visit Yes   Microalbumin No   HgbA1C Yes   Diabetic Eye Exam No    House Bill One Due This Visit   JOEL No   UDS Yes   Contract No      Health Maintenance Due   Topic Date Due    DTaP/Tdap/Td vaccine (1 - Tdap) 01/12/1983    Shingles Vaccine (1 of 2) 01/12/2014    A1C test (Diabetic or Prediabetic)  12/26/2019    Flu vaccine (1) 09/01/2020    Annual Wellness Visit (AWV)  09/18/2020       Low Dose Lung Screening and Sleep Study are in chart to review with patient.

## 2020-09-15 ENCOUNTER — OFFICE VISIT (OUTPATIENT)
Dept: FAMILY MEDICINE CLINIC | Age: 55
End: 2020-09-15
Payer: MEDICARE

## 2020-09-15 VITALS
BODY MASS INDEX: 34.19 KG/M2 | TEMPERATURE: 97.9 F | DIASTOLIC BLOOD PRESSURE: 79 MMHG | WEIGHT: 258 LBS | SYSTOLIC BLOOD PRESSURE: 117 MMHG | RESPIRATION RATE: 20 BRPM | OXYGEN SATURATION: 98 % | HEIGHT: 73 IN | HEART RATE: 79 BPM

## 2020-09-15 PROCEDURE — 3017F COLORECTAL CA SCREEN DOC REV: CPT | Performed by: NURSE PRACTITIONER

## 2020-09-15 PROCEDURE — G8417 CALC BMI ABV UP PARAM F/U: HCPCS | Performed by: NURSE PRACTITIONER

## 2020-09-15 PROCEDURE — G8427 DOCREV CUR MEDS BY ELIG CLIN: HCPCS | Performed by: NURSE PRACTITIONER

## 2020-09-15 PROCEDURE — 4004F PT TOBACCO SCREEN RCVD TLK: CPT | Performed by: NURSE PRACTITIONER

## 2020-09-15 PROCEDURE — 99212 OFFICE O/P EST SF 10 MIN: CPT | Performed by: NURSE PRACTITIONER

## 2020-09-15 RX ORDER — SYRINGE WITH NEEDLE, 1 ML 25GX5/8"
SYRINGE, EMPTY DISPOSABLE MISCELLANEOUS
COMMUNITY
Start: 2020-08-21

## 2020-09-15 ASSESSMENT — ENCOUNTER SYMPTOMS
CHEST TIGHTNESS: 0
DIARRHEA: 0
VOMITING: 0
ABDOMINAL PAIN: 0
GASTROINTESTINAL NEGATIVE: 1
ALLERGIC/IMMUNOLOGIC NEGATIVE: 1
EYES NEGATIVE: 1
NAUSEA: 0
SHORTNESS OF BREATH: 1
COUGH: 0

## 2020-09-15 ASSESSMENT — PATIENT HEALTH QUESTIONNAIRE - PHQ9
2. FEELING DOWN, DEPRESSED OR HOPELESS: 0
SUM OF ALL RESPONSES TO PHQ QUESTIONS 1-9: 0
1. LITTLE INTEREST OR PLEASURE IN DOING THINGS: 0
SUM OF ALL RESPONSES TO PHQ QUESTIONS 1-9: 0
SUM OF ALL RESPONSES TO PHQ9 QUESTIONS 1 & 2: 0

## 2020-09-15 NOTE — PROGRESS NOTES
SUBJECTIVE:    Chad Mcnamara is a 63 y.o. male    Pt presents to review CT results. Pt has been under the care of Dr [facility]. Pt reports he last saw him a couple of years ago for shortness of breath. Pt reports he felt that Dr James Sanderson thought his symptoms were allergy related. Pt reports he had a negative heart cath a few years ago. Pt reports he quit taking Tricor several months ago due to nausea. Pt reports he was given it at the pharmacy and he no longer takes it.     Lab Results       Component                Value               Date                       CHOL                     161                 07/02/2020                 CHOL                     148                 01/07/2020                 CHOL                     154                 12/26/2018            Lab Results       Component                Value               Date                       TRIG                     229 (H)             07/02/2020                 TRIG                     291 (H)             01/07/2020                 TRIG                     238 (H)             12/26/2018            Lab Results       Component                Value               Date                       HDL                      39 (L)              07/02/2020                 HDL                      30 (L)              01/07/2020                 HDL                      33 (L)              12/26/2018            Lab Results       Component                Value               Date                       LDLCALC                  76                  07/02/2020                 LDLCALC                  60                  01/07/2020                 LDLCALC                  73                  12/26/2018            Lab Results       Component                Value               Date                       LABVLDL                  46 (H)              07/02/2020                 LABVLDL                  58 (H)              01/07/2020                 LABVLDL 48 (H)              12/26/2018            Lab Results       Component                Value               Date                       CHOLHDLRATIO             3.4                 08/26/2013                   Chief Complaint   Patient presents with    Abnormal CT     pt here for f/u on CT    Hyperlipidemia     Tricor making pt sick         Review of Systems   Constitutional: Negative. HENT: Negative. Eyes: Negative. Respiratory: Positive for shortness of breath (chronic- worse with exertion. has been evaluated by Cardiology with negative heart cath per patient. ). Negative for cough and chest tightness. Cardiovascular: Negative. Negative for chest pain, palpitations and leg swelling. Gastrointestinal: Negative. Negative for abdominal pain, diarrhea, nausea and vomiting. Endocrine: Negative. Genitourinary: Negative. Musculoskeletal: Negative. Allergic/Immunologic: Negative. Neurological: Negative. Hematological: Negative. Psychiatric/Behavioral: Negative. OBJECTIVE:    /79   Pulse 79   Temp 97.9 °F (36.6 °C)   Resp 20   Ht 6' 1\" (1.854 m)   Wt 258 lb (117 kg)   SpO2 98%   BMI 34.04 kg/m²    Physical Exam  Vitals signs and nursing note reviewed. Constitutional:       General: He is not in acute distress. Appearance: Normal appearance. He is well-developed. He is obese. He is not ill-appearing or diaphoretic. HENT:      Head: Normocephalic. Eyes:      Extraocular Movements: Extraocular movements intact. Conjunctiva/sclera: Conjunctivae normal.      Pupils: Pupils are equal, round, and reactive to light. Neck:      Thyroid: No thyromegaly. Vascular: No carotid bruit. Cardiovascular:      Rate and Rhythm: Normal rate and regular rhythm. Heart sounds: Normal heart sounds. No murmur. No friction rub. No gallop. Pulmonary:      Effort: Pulmonary effort is normal.      Breath sounds: Normal breath sounds.    Skin:     General: Skin is warm and dry. Neurological:      Mental Status: He is alert and oriented to person, place, and time. Psychiatric:         Mood and Affect: Mood normal.         Behavior: Behavior normal.         Thought Content: Thought content normal.         Judgment: Judgment normal.         ASSESSMENT/PLAN:   Nella Carlisle was seen today for abnormal ct and hyperlipidemia. Diagnoses and all orders for this visit:      Coronary artery calcification  -     External Referral To Cardiology    Hyperlipidemia- Pt reports he has been unable to tolerate statins and tricor due to nausea. Last LDL was 76 on 07/2/2020. Pt agrees to follow a low fat diet and repeat Fasting lipid pane in October for re evaluation. Return in about 1 month (around 10/15/2020) for fasting lipid and follow up with Cardiology when scheduled. Current Outpatient Medications on File Prior to Visit   Medication Sig Dispense Refill    B-D 3CC LUER-RACHAEL SYR 23GX1\" 23G X 1\" 3 ML MISC       diazePAM (VALIUM) 5 MG tablet Take 1 tablet by mouth every 12 hours as needed for Anxiety or Sleep for up to 30 days. 60 tablet 0    triamterene-hydroCHLOROthiazide (MAXZIDE-25) 37.5-25 MG per tablet Take 1 tablet by mouth daily 30 tablet 5    montelukast (SINGULAIR) 10 MG tablet Take 1 tablet by mouth nightly 30 tablet 5    desloratadine (CLARINEX) 5 MG tablet Take 1 tablet by mouth daily 90 tablet 1    ondansetron (ZOFRAN) 4 MG tablet Take 1 tablet by mouth daily as needed for Nausea or Vomiting 30 tablet 5    fenofibrate (TRICOR) 48 MG tablet Take 1 tablet by mouth daily 30 tablet 3    vitamin D (ERGOCALCIFEROL) 1.25 MG (46185 UT) CAPS capsule Take 1 capsule by mouth once a week 4 capsule 5    omeprazole (PRILOSEC) 40 MG delayed release capsule Take 40 mg by mouth daily  6    NARCAN 4 MG/0.1ML LIQD nasal spray 4 mg by Nasal route  0    HYDROcodone-acetaminophen (NORCO)  MG per tablet Take  tablets by mouth every 4-6 hours as needed. Ronold Kanner   0    testosterone cypionate (DEPOTESTOTERONE CYPIONATE) 200 MG/ML injection INJECT 2ML EVERY 2 WEEKS  5    VENTOLIN  (90 Base) MCG/ACT inhaler   6    ASPIR-LOW 81 MG EC tablet Take 1 tablet by mouth daily  11    fluticasone (FLONASE) 50 MCG/ACT nasal spray 1 spray by Nasal route daily 1 Bottle 3    lisinopril (PRINIVIL;ZESTRIL) 5 MG tablet Take 1 tablet by mouth 2 times daily 60 tablet 3     No current facility-administered medications on file prior to visit.

## 2020-09-15 NOTE — PROGRESS NOTES
Have you seen any other physician or provider since your last visit no    Have you had any other diagnostic tests since your last visit? yes - CT of lungs    Have you changed or stopped any medications since your last visit?  yes - started Tricor

## 2020-10-05 RX ORDER — DIAZEPAM 5 MG/1
5 TABLET ORAL EVERY 12 HOURS PRN
Qty: 60 TABLET | Refills: 0 | Status: SHIPPED | OUTPATIENT
Start: 2020-10-05 | End: 2020-11-03 | Stop reason: SDUPTHER

## 2020-11-03 ENCOUNTER — OFFICE VISIT (OUTPATIENT)
Dept: FAMILY MEDICINE CLINIC | Age: 55
End: 2020-11-03
Payer: MEDICARE

## 2020-11-03 VITALS
SYSTOLIC BLOOD PRESSURE: 118 MMHG | HEIGHT: 73 IN | OXYGEN SATURATION: 94 % | TEMPERATURE: 97.1 F | BODY MASS INDEX: 34.75 KG/M2 | DIASTOLIC BLOOD PRESSURE: 76 MMHG | RESPIRATION RATE: 18 BRPM | WEIGHT: 262.2 LBS | HEART RATE: 75 BPM

## 2020-11-03 PROCEDURE — G8427 DOCREV CUR MEDS BY ELIG CLIN: HCPCS | Performed by: NURSE PRACTITIONER

## 2020-11-03 PROCEDURE — 3017F COLORECTAL CA SCREEN DOC REV: CPT | Performed by: NURSE PRACTITIONER

## 2020-11-03 PROCEDURE — G8417 CALC BMI ABV UP PARAM F/U: HCPCS | Performed by: NURSE PRACTITIONER

## 2020-11-03 PROCEDURE — 4004F PT TOBACCO SCREEN RCVD TLK: CPT | Performed by: NURSE PRACTITIONER

## 2020-11-03 PROCEDURE — 99213 OFFICE O/P EST LOW 20 MIN: CPT | Performed by: NURSE PRACTITIONER

## 2020-11-03 PROCEDURE — G8484 FLU IMMUNIZE NO ADMIN: HCPCS | Performed by: NURSE PRACTITIONER

## 2020-11-03 RX ORDER — PREDNISOLONE ACETATE 10 MG/ML
1 SUSPENSION/ DROPS OPHTHALMIC DAILY
COMMUNITY
Start: 2020-10-30

## 2020-11-03 RX ORDER — HYDROCODONE POLISTIREX AND CHLORPHENIRAMINE POLISTIREX 10; 8 MG/5ML; MG/5ML
5 SUSPENSION, EXTENDED RELEASE ORAL EVERY 12 HOURS PRN
Qty: 60 ML | Refills: 0 | Status: SHIPPED | OUTPATIENT
Start: 2020-11-03 | End: 2020-12-03

## 2020-11-03 RX ORDER — OFLOXACIN 3 MG/ML
1 SOLUTION/ DROPS OPHTHALMIC DAILY
COMMUNITY
Start: 2020-10-30

## 2020-11-03 RX ORDER — DIAZEPAM 5 MG/1
5 TABLET ORAL EVERY 12 HOURS PRN
Qty: 60 TABLET | Refills: 0 | Status: SHIPPED | OUTPATIENT
Start: 2020-12-03 | End: 2021-01-05 | Stop reason: SDUPTHER

## 2020-11-03 RX ORDER — DIAZEPAM 5 MG/1
5 TABLET ORAL EVERY 12 HOURS PRN
Qty: 60 TABLET | Refills: 0 | Status: SHIPPED | OUTPATIENT
Start: 2020-11-03 | End: 2020-11-03 | Stop reason: SDUPTHER

## 2020-11-03 ASSESSMENT — ENCOUNTER SYMPTOMS
EYE PAIN: 0
NAUSEA: 0
COUGH: 1
WHEEZING: 1
SHORTNESS OF BREATH: 0
ABDOMINAL PAIN: 0
EYE REDNESS: 0
CHEST TIGHTNESS: 0
DIARRHEA: 0
SORE THROAT: 0
TROUBLE SWALLOWING: 0
VOMITING: 0

## 2020-11-03 NOTE — PROGRESS NOTES
SUBJECTIVE:    Patient ID: Jeanne Blakely is a 63 y.o. male. Chief Complaint   Patient presents with    Arthritis     f/u, going to Ortho Thursday 11/05     Joan Nguyen is a 48-year-old  male presents to the clinic today for a 1 month follow-up for anxiety disorder and sleep disturbance. Patient is requesting a refill on prescribed Valium 5 mg PO BID PRN. Patient reports he has been taking this medication for several years. This continues to control his anxiety disorder and helps with insomnia. He currently is asymptomatic. Denies any new or worsening complaints. No signs or symptoms of adverse reactions with this medicine. Patient continues to take this medication as prescribed.           Active Ambulatory Problems     Diagnosis Date Noted    RA (rheumatoid arthritis) ([facility] [address] 11/30/2012    Spinal stenosis 11/30/2012    Anxiety 08/21/2015    Headache 08/21/2015    Hypertension 08/21/2015    Low testosterone in male 03/12/2018    Mixed hyperlipidemia 06/14/2018     Resolved Ambulatory Problems     Diagnosis Date Noted    Cellulitis of groin, right 11/30/2012    Abdominal pain, acute, generalized 02/18/2013    Rectal bleeding 03/11/2013    Chest pain 03/17/2014     Past Medical History:   Diagnosis Date    Arthritis     Chronic back pain     Depression     Fibromyalgia     Reflux esophagitis       Allergies   Allergen Reactions    Dicyclomine Hcl     Pcn [Penicillins]     Tolectin [Tolmetin Sodium]       Past Surgical History:   Procedure Laterality Date    CHOLECYSTECTOMY      COLONOSCOPY      SKIN BIOPSY      TONSILLECTOMY        Family History   Problem Relation Age of Onset    Arthritis Mother     Asthma Mother     Heart Disease Mother     High Blood Pressure Mother     Arthritis Father     Heart Disease Father     Depression Sister     Arthritis Sister        Patient's medications, allergies, past medical, surgical, social and family histories were reviewed and updated as appropriate. Current Outpatient Medications on File Prior to Visit   Medication Sig Dispense Refill    ofloxacin (OCUFLOX) 0.3 % solution Apply 1 drop to eye daily      prednisoLONE acetate (PRED FORTE) 1 % ophthalmic suspension Apply 1 drop to eye daily      B-D 3CC LUER-RACHAEL SYR 23GX1\" 23G X 1\" 3 ML MISC       triamterene-hydroCHLOROthiazide (MAXZIDE-25) 37.5-25 MG per tablet Take 1 tablet by mouth daily 30 tablet 5    desloratadine (CLARINEX) 5 MG tablet Take 1 tablet by mouth daily 90 tablet 1    ondansetron (ZOFRAN) 4 MG tablet Take 1 tablet by mouth daily as needed for Nausea or Vomiting 30 tablet 5    vitamin D (ERGOCALCIFEROL) 1.25 MG (61534 UT) CAPS capsule Take 1 capsule by mouth once a week 4 capsule 5    lisinopril (PRINIVIL;ZESTRIL) 5 MG tablet Take 1 tablet by mouth 2 times daily 60 tablet 3    omeprazole (PRILOSEC) 40 MG delayed release capsule Take 40 mg by mouth daily  6    NARCAN 4 MG/0.1ML LIQD nasal spray 4 mg by Nasal route  0    HYDROcodone-acetaminophen (NORCO)  MG per tablet Take  tablets by mouth every 4-6 hours as needed. Pamalee Bucker 0    testosterone cypionate (DEPOTESTOTERONE CYPIONATE) 200 MG/ML injection INJECT 2ML EVERY 2 WEEKS  5    VENTOLIN  (90 Base) MCG/ACT inhaler   6    fluticasone (FLONASE) 50 MCG/ACT nasal spray 1 spray by Nasal route daily 1 Bottle 3    fenofibrate (TRICOR) 48 MG tablet Take 1 tablet by mouth daily (Patient not taking: Reported on 11/3/2020) 30 tablet 3    ASPIR-LOW 81 MG EC tablet Take 1 tablet by mouth daily  11     No current facility-administered medications on file prior to visit. Review of Systems   Constitutional: Negative for chills and fever. HENT: Negative for congestion, ear pain, nosebleeds, sore throat and trouble swallowing. Eyes: Negative for pain and redness. Respiratory: Positive for cough and wheezing. Negative for chest tightness and shortness of breath.     Cardiovascular: Negative for chest pain and palpitations. Gastrointestinal: Negative for abdominal pain, diarrhea, nausea and vomiting. Genitourinary: Negative for difficulty urinating, dysuria and flank pain. Musculoskeletal: Negative for arthralgias and gait problem. Skin: Negative for pallor and rash. Neurological: Negative for dizziness, numbness and headaches. Psychiatric/Behavioral: Positive for sleep disturbance (chronic-stable). Negative for agitation. The patient is nervous/anxious (chronic-stable). OBJECTIVE:  /76   Pulse 75   Temp 97.1 °F (36.2 °C) (Temporal)   Resp 18   Ht 6' 1\" (1.854 m)   Wt 262 lb 3.2 oz (118.9 kg)   SpO2 94% Comment: room air  BMI 34.59 kg/m²       Physical Exam  Vitals signs and nursing note reviewed. Constitutional:       General: He is not in acute distress. Appearance: He is well-developed. He is not diaphoretic. HENT:      Head: Normocephalic and atraumatic. Right Ear: External ear normal.      Left Ear: External ear normal.      Nose: Congestion and rhinorrhea present. Mouth/Throat:      Mouth: Mucous membranes are moist.      Pharynx: Oropharynx is clear. No oropharyngeal exudate or posterior oropharyngeal erythema. Eyes:      General: No scleral icterus. Right eye: No discharge. Left eye: No discharge. Extraocular Movements: Extraocular movements intact. Conjunctiva/sclera: Conjunctivae normal.      Pupils: Pupils are equal, round, and reactive to light. Neck:      Musculoskeletal: Normal range of motion and neck supple. Thyroid: No thyromegaly. Vascular: No JVD. Trachea: No tracheal deviation. Cardiovascular:      Rate and Rhythm: Normal rate and regular rhythm. Heart sounds: Normal heart sounds. No murmur. No friction rub. No gallop. Pulmonary:      Effort: Pulmonary effort is normal. No respiratory distress. Breath sounds: No stridor. Examination of the right-upper field reveals wheezing. Examination of the left-upper field reveals wheezing. Wheezing present. No rales. Chest:      Chest wall: No tenderness. Abdominal:      General: Bowel sounds are normal. There is no distension. Palpations: Abdomen is soft. There is no mass. Tenderness: There is no abdominal tenderness. There is no guarding or rebound. Musculoskeletal:         General: Tenderness present. No deformity. Lumbar back: He exhibits decreased range of motion, tenderness, bony tenderness, pain and spasm. Lymphadenopathy:      Cervical: No cervical adenopathy. Skin:     General: Skin is warm and dry. Capillary Refill: Capillary refill takes less than 2 seconds. Coloration: Skin is not pale. Findings: No erythema or rash. Neurological:      Mental Status: He is alert and oriented to person, place, and time. Sensory: No sensory deficit. Coordination: Coordination normal.   Psychiatric:         Mood and Affect: Mood is anxious. Speech: Speech normal.         Behavior: Behavior normal.         Thought Content: Thought content normal.         Judgment: Judgment normal.         No results found for requested labs within last 30 days. Hemoglobin A1C (%)   Date Value   12/26/2018 5.8     LDL Calculated (mg/dL)   Date Value   07/02/2020 76         Lab Results   Component Value Date    WBC 11.7 07/21/2020    NEUTROABS 7.3 07/21/2020    HGB 17.3 07/21/2020    HCT 50.7 07/21/2020    MCV 91 07/21/2020     07/21/2020       Lab Results   Component Value Date    TSH 1.320 07/02/2020          ASSESSMENT/PLAN:     Chanda Sanchez was seen today for arthritis. Diagnoses and all orders for this visit:    Cough    -     HYDROcodone-chlorpheniramine (TUSSIONEX PENNKINETIC ER) 10-8 MG/5ML SUER; Take 5 mLs by mouth every 12 hours as needed (cough) for up to 30 days. Anxiety    -     diazePAM (VALIUM) 5 MG tablet;  Take 1 tablet by mouth every 12 hours as needed for Anxiety or Sleep for up to 30 days.    Insomnia, unspecified type    -     diazePAM (VALIUM) 5 MG tablet; Take 1 tablet by mouth every 12 hours as needed for Anxiety or Sleep for up to 30 days. Controlled Substances Monitoring:     RX Monitoring 11/3/2020   Attestation -   Acute Pain Prescriptions -   Periodic Controlled Substance Monitoring Possible medication side effects, risk of tolerance/dependence & alternative treatments discussed. Re-evaluated the status of the patient's underlying condition causing pain. -        PATIENT COUNSELING     Counseling was provided today regarding the following topics: Healthy eating habits, Regular exercise, substance abuse and healthy sleep habits. Discussed use, benefit, and side effects of prescribed medications. Barriers to medication compliance addressed. All patient questions answered. Patient voiced understanding. Medications Discontinued During This Encounter   Medication Reason    montelukast (SINGULAIR) 10 MG tablet Patient Choice    diazePAM (VALIUM) 5 MG tablet REORDER    diazePAM (VALIUM) 5 MG tablet REORDER     Return in about 1 month (around 12/3/2020). ANIYA Gaytan - CNP     Education was provided for discussed topics. Call the office with worsening complaints or any side effects to any medications. If an emergency please call 911.

## 2020-11-03 NOTE — PROGRESS NOTES
Chief Complaint   Patient presents with    Arthritis     f/u, going to Ortho Thursday 11/05       Have you seen any other physician or provider since your last visit no    Have you had any other diagnostic tests since your last visit? no    Have you changed or stopped any medications since your last visit? no     I have recommended that this patient have a flu shot and shingles vaccine but he declines at this time. I have discussed the risks and benefits of this examination with him. The patient verbalizes understanding. Diabetic retinal exam completed this year?  Yes-N/A                       * If yes please have patient sign a records release to obtain record to update Health Maintenance                       * If no, please order referral for patient to be scheduled         Health Maintenance Due This Visit   Colonoscopy No   Mammogram No   Annual Wellness Visit Yes   Microalbumin No   HgbA1C Yes   Diabetic Eye Exam No    House Bill One Due This Visit   JOEL Yes   UDS Yes   Contract Yes

## 2020-11-05 ENCOUNTER — OFFICE VISIT (OUTPATIENT)
Dept: ORTHOPEDIC SURGERY | Facility: CLINIC | Age: 55
End: 2020-11-05

## 2020-11-05 VITALS — HEART RATE: 87 BPM | BODY MASS INDEX: 27.57 KG/M2 | WEIGHT: 208 LBS | OXYGEN SATURATION: 98 % | HEIGHT: 73 IN

## 2020-11-05 DIAGNOSIS — M48.061 LUMBAR STENOSIS WITHOUT NEUROGENIC CLAUDICATION: ICD-10-CM

## 2020-11-05 DIAGNOSIS — M25.562 PAIN IN BOTH KNEES, UNSPECIFIED CHRONICITY: ICD-10-CM

## 2020-11-05 DIAGNOSIS — M25.561 PAIN IN BOTH KNEES, UNSPECIFIED CHRONICITY: ICD-10-CM

## 2020-11-05 DIAGNOSIS — M17.0 PRIMARY OSTEOARTHRITIS OF BOTH KNEES: Primary | ICD-10-CM

## 2020-11-05 DIAGNOSIS — Z72.0 TOBACCO ABUSE: ICD-10-CM

## 2020-11-05 PROCEDURE — 20610 DRAIN/INJ JOINT/BURSA W/O US: CPT | Performed by: ORTHOPAEDIC SURGERY

## 2020-11-05 PROCEDURE — 99204 OFFICE O/P NEW MOD 45 MIN: CPT | Performed by: ORTHOPAEDIC SURGERY

## 2020-11-05 PROCEDURE — 99406 BEHAV CHNG SMOKING 3-10 MIN: CPT | Performed by: ORTHOPAEDIC SURGERY

## 2020-11-05 RX ORDER — TRIAMCINOLONE ACETONIDE 40 MG/ML
80 INJECTION, SUSPENSION INTRA-ARTICULAR; INTRAMUSCULAR
Status: COMPLETED | OUTPATIENT
Start: 2020-11-05 | End: 2020-11-05

## 2020-11-05 RX ORDER — LIDOCAINE HYDROCHLORIDE 10 MG/ML
3 INJECTION, SOLUTION EPIDURAL; INFILTRATION; INTRACAUDAL; PERINEURAL
Status: COMPLETED | OUTPATIENT
Start: 2020-11-05 | End: 2020-11-05

## 2020-11-05 RX ORDER — BUPIVACAINE HYDROCHLORIDE 2.5 MG/ML
3 INJECTION, SOLUTION EPIDURAL; INFILTRATION; INTRACAUDAL
Status: COMPLETED | OUTPATIENT
Start: 2020-11-05 | End: 2020-11-05

## 2020-11-05 RX ORDER — DIAZEPAM 5 MG/1
5 TABLET ORAL 2 TIMES DAILY PRN
COMMUNITY

## 2020-11-05 RX ORDER — TRIAMTERENE AND HYDROCHLOROTHIAZIDE 37.5; 25 MG/1; MG/1
1 TABLET ORAL DAILY
COMMUNITY

## 2020-11-05 RX ORDER — HYDROCODONE BITARTRATE AND ACETAMINOPHEN 10; 325 MG/1; MG/1
1 TABLET ORAL EVERY 6 HOURS PRN
COMMUNITY

## 2020-11-05 RX ORDER — ALBUTEROL SULFATE 90 UG/1
2 AEROSOL, METERED RESPIRATORY (INHALATION) AS NEEDED
COMMUNITY

## 2020-11-05 RX ORDER — TESTOSTERONE GEL, 1% 10 MG/G
400 GEL TRANSDERMAL DAILY
COMMUNITY

## 2020-11-05 RX ORDER — OMEPRAZOLE 40 MG/1
40 CAPSULE, DELAYED RELEASE ORAL DAILY
COMMUNITY

## 2020-11-05 RX ORDER — LISINOPRIL 5 MG/1
5 TABLET ORAL DAILY
COMMUNITY

## 2020-11-05 RX ADMIN — LIDOCAINE HYDROCHLORIDE 3 ML: 10 INJECTION, SOLUTION EPIDURAL; INFILTRATION; INTRACAUDAL; PERINEURAL at 11:01

## 2020-11-05 RX ADMIN — TRIAMCINOLONE ACETONIDE 80 MG: 40 INJECTION, SUSPENSION INTRA-ARTICULAR; INTRAMUSCULAR at 11:01

## 2020-11-05 RX ADMIN — BUPIVACAINE HYDROCHLORIDE 3 ML: 2.5 INJECTION, SOLUTION EPIDURAL; INFILTRATION; INTRACAUDAL at 11:01

## 2020-11-05 NOTE — PROGRESS NOTES
Procedure   Large Joint Arthrocentesis: R knee  Date/Time: 11/5/2020 11:01 AM  Consent given by: patient  Site marked: site marked  Timeout: Immediately prior to procedure a time out was called to verify the correct patient, procedure, equipment, support staff and site/side marked as required   Supporting Documentation  Indications: pain   Procedure Details  Location: knee - R knee  Preparation: Patient was prepped and draped in the usual sterile fashion  Needle size: 22 G  Approach: anterolateral  Medications administered: 3 mL bupivacaine (PF) 0.25 %; 3 mL lidocaine PF 1% 1 %; 80 mg triamcinolone acetonide 40 MG/ML  Patient tolerance: patient tolerated the procedure well with no immediate complications    Large Joint Arthrocentesis: L knee  Date/Time: 11/5/2020 11:01 AM  Consent given by: patient  Site marked: site marked  Timeout: Immediately prior to procedure a time out was called to verify the correct patient, procedure, equipment, support staff and site/side marked as required   Supporting Documentation  Indications: pain   Procedure Details  Location: knee - L knee  Preparation: Patient was prepped and draped in the usual sterile fashion  Needle size: 22 G  Approach: anterolateral  Medications administered: 3 mL bupivacaine (PF) 0.25 %; 3 mL lidocaine PF 1% 1 %; 80 mg triamcinolone acetonide 40 MG/ML  Patient tolerance: patient tolerated the procedure well with no immediate complications

## 2020-11-05 NOTE — PROGRESS NOTES
Orthopaedic Clinic Note: Knee New Patient    Chief Complaint   Patient presents with   • Left Knee - Pain   • Right Knee - Pain        HPI  Consult from: Eduar Canas,*    Kendrick Alvarez is a 55 y.o. male who presents with bilateral knee pain for 12 year(s). Onset atraumatic and gradual in nature. Pain is localized to the entire knee (globally) and is a 7/10 on the pain scale. Pain is described as dull and throbbing.  He states that when he stands for long periods of time, his legs feel weak in his knees buckle.  He does have a known history of spinal stenosis as diagnosed by Dr. Uri Dewitt.  Associated symptoms include pain, swelling, popping, grinding, stiffness and giving way/buckling. The pain is worse with walking, standing, sitting, climbing stairs and working; resting, medication and heat make it better. Previous treatments have included: cane/walker, physical therapy and weight loss since symptom onset. Although some transient relief was reported with these interventions, these conservative measures have failed and symptoms have persisted. The patient is limited in daily activities and has had a significant decrease in quality of life as a result. He denies fevers, chills, or constitutional symptoms.  Of note, he is a daily nicotine user.    I have reviewed the following portions of the patient's history:History of Present Illness    Past Medical History:   Diagnosis Date   • Osteoarthritis       Past Surgical History:   Procedure Laterality Date   • GALLBLADDER SURGERY        Family History   Problem Relation Age of Onset   • Osteoarthritis Mother    • Cancer Father      Social History     Socioeconomic History   • Marital status: Single     Spouse name: Not on file   • Number of children: Not on file   • Years of education: Not on file   • Highest education level: Not on file   Tobacco Use   • Smoking status: Current Some Day Smoker     Types: Cigarettes     Start date: 1980   • Smokeless  tobacco: Never Used   Substance and Sexual Activity   • Alcohol use: Never     Frequency: Never   • Drug use: Never   • Sexual activity: Defer      Current Outpatient Medications on File Prior to Visit   Medication Sig Dispense Refill   • albuterol sulfate  (90 Base) MCG/ACT inhaler Inhale 2 puffs As Needed for Wheezing.     • diazePAM (VALIUM) 5 MG tablet Take 5 mg by mouth 2 (Two) Times a Day As Needed for Anxiety.     • fluticasone (VERAMYST) 27.5 MCG/SPRAY nasal spray 2 sprays into the nostril(s) as directed by provider Daily.     • HYDROcodone-acetaminophen (NORCO)  MG per tablet Take 1 tablet by mouth Every 6 (Six) Hours As Needed for Moderate Pain .     • lisinopril (PRINIVIL,ZESTRIL) 5 MG tablet Take 5 mg by mouth Daily.     • loratadine (CLARITIN) 5 MG chewable tablet Chew 5 mg Daily.     • omeprazole (priLOSEC) 40 MG capsule Take 40 mg by mouth Daily.     • testosterone (ANDROGEL) 50 MG/5GM (1%) gel gel Place 400 mg on the skin as directed by provider Daily.     • triamterene-hydrochlorothiazide (MAXZIDE-25) 37.5-25 MG per tablet Take 1 tablet by mouth Daily.       No current facility-administered medications on file prior to visit.       Allergies   Allergen Reactions   • Nsaids Anaphylaxis   • Penicillins Other (See Comments)     As a young child, unable to remember        Review of Systems   Constitutional: Negative.    HENT: Negative.    Eyes: Negative.    Respiratory: Negative.    Cardiovascular: Negative.    Gastrointestinal: Negative.    Endocrine: Negative.    Genitourinary: Negative.    Musculoskeletal: Positive for arthralgias.   Skin: Negative.    Allergic/Immunologic: Negative.    Neurological: Negative.    Hematological: Negative.    Psychiatric/Behavioral: Negative.         The patient's Review of Systems was personally reviewed and confirmed as accurate.    The following portions of the patient's history were reviewed and updated as appropriate: allergies, current medications,  "past family history, past medical history, past social history, past surgical history and problem list.    Physical Exam  Pulse 87, height 184.2 cm (72.5\"), weight 94.3 kg (208 lb), SpO2 98 %.    Body mass index is 27.82 kg/m².    GENERAL APPEARANCE: awake, alert & oriented x 3, in no acute distress and well developed, well nourished  PSYCH: normal affect  LUNGS:  breathing nonlabored  EYES: sclera anicteric  CARDIOVASCULAR: palpable dorsalis pedis, palpable posterior tibial bilaterally. Capillary refill less than 2 seconds  EXTREMITIES: no clubbing, cyanosis  GAIT:  Antalgic            Right Lower Extremity Exam:   ----------  Hip Exam  ----------  FLEXION CONTRACTURE: None  FLEXION: 110 degrees  INTERNAL ROTATION: 20 degrees at 90 degrees of flexion   EXTERNAL ROTATION: 40 degrees at 90 degrees of flexion    PAIN WITH HIP MOTION: no  ----------  Knee Exam  ----------  ALIGNMENT: neutral, no varus or valgus deformity     RANGE OF MOTION:  Normal (0-120 degrees) with no extensor lag or flexion contracture  LIGAMENTOUS STABILITY:   stable to varus and valgus stress at 0 and 30 degrees without any evidence of laxity     STRENGTH:  5/5 knee flexion, extension. 5/5 ankle dorsiflexion and plantarflexion.     PAIN WITH PALPATION: Slight tenderness about medial lateral joint line  KNEE EFFUSION: Trace effusion  PAIN WITH KNEE ROM: Yes anteriorly  PATELLAR CREPITUS: yes, painful and symptomatic  SPECIAL EXAM FINDINGS:  Negative patellar compression    REFLEXES:  PATELLAR 2+/4  ACHILLES 2+/4    CLONUS: negative  STRAIGHT LEG TEST:   negative    SENSATION TO LIGHT TOUCH:  DEEP PERONEAL/SUPERFICIAL PERONEAL/SURAL/SAPHENOUS/TIBIAL:   intact    EDEMA:  no  ERYTHEMA:  no  WOUNDS/INCISIONS: no overlying skin problems.      Left Lower Extremity Exam:   ----------  Hip Exam  ----------  FLEXION CONTRACTURE: None  FLEXION: 110 degrees  INTERNAL ROTATION: 20 degrees at 90 degrees of flexion   EXTERNAL ROTATION: 40 degrees at 90 " degrees of flexion    PAIN WITH HIP MOTION: no  ----------  Knee Exam  ----------  ALIGNMENT: neutral, no varus or valgus deformity     RANGE OF MOTION:  Normal (0-120 degrees) with no extensor lag or flexion contracture  LIGAMENTOUS STABILITY:   stable to varus and valgus stress at 0 and 30 degrees without any evidence of laxity     STRENGTH:  5/5 knee flexion, extension. 5/5 ankle dorsiflexion and plantarflexion.     PAIN WITH PALPATION: Slight tenderness about medial lateral joint line  KNEE EFFUSION: Trace effusion  PAIN WITH KNEE ROM: Yes anteriorly  PATELLAR CREPITUS: yes, painful and symptomatic  SPECIAL EXAM FINDINGS:  Negative patellar compression    REFLEXES:  PATELLAR 2+/4  ACHILLES 2+/4    CLONUS: negative  STRAIGHT LEG TEST:   negative    SENSATION TO LIGHT TOUCH:  DEEP PERONEAL/SUPERFICIAL PERONEAL/SURAL/SAPHENOUS/TIBIAL:   intact    EDEMA:  no  ERYTHEMA:  no  WOUNDS/INCISIONS: no overlying skin problems.      ______________________________________________________________________  ______________________________________________________________________    RADIOGRAPHIC FINDINGS:   Indication: Bilateral knee pain    Comparison: No prior xrays are available for comparison    Bilateral knee(s) 4 views: Moderate medial and lateral compartment osteoarthritis with neutral alignment.  Small periarticular osteophytes visualized in all compartments with severe arthritic findings with bone-on-bone articulation patellofemoral compartments bilaterally.  No acute bony injury or fracture.    Assessment/Plan:   Diagnosis Plan   1. Primary osteoarthritis of both knees     2. Pain in both knees, unspecified chronicity  XR Knee 4+ View Bilateral   3. Tobacco abuse     4. Lumbar stenosis without neurogenic claudication       Patient has advanced knee arthritis in the patellofemoral joints bilaterally.  He is also experiencing spinal stenosis.  It is unclear how much of his pain is due to the arthritis in the knees versus  the spinal stenosis symptoms as his symptoms are predominantly when he is standing for long periods of time and improve with flexion at the waist.  I recommend proceeding with cortisone injections both knees today to see if this alleviates his pain.  He is agreeable to this.    I spent approximately 3-10 minutes counseling the patient regarding the adverse effects of tobacco use.  Included in this counseling session were treatment options for cessation including quitting cold turkey, medication, nicotine step-down programs, and smoking cessation programs.  Furthermore, I explained to the patient the importance of abstaining from nicotine usage as nicotine is known to increase the risk of complications associated with surgery including but not limited to infection, decreased wound healing, slowed soft tissue healing, and increased surgery associated pain.  As a result of this counseling session, the patient will weigh the options and determine how to proceed with achieving tobacco cessation in preparation for surgery. This topic will be revisited upon return to clinic.    Moise Nix MD  11/05/20  11:02 EST

## 2020-12-07 ENCOUNTER — VIRTUAL VISIT (OUTPATIENT)
Dept: FAMILY MEDICINE CLINIC | Age: 55
End: 2020-12-07
Payer: MEDICARE

## 2020-12-07 PROCEDURE — 99406 BEHAV CHNG SMOKING 3-10 MIN: CPT | Performed by: NURSE PRACTITIONER

## 2020-12-07 PROCEDURE — G2025 DIS SITE TELE SVCS RHC/FQHC: HCPCS | Performed by: NURSE PRACTITIONER

## 2020-12-25 RX ORDER — MONTELUKAST SODIUM 10 MG/1
TABLET ORAL
COMMUNITY
Start: 2020-11-23

## 2020-12-25 RX ORDER — CARVEDILOL 3.12 MG/1
TABLET ORAL
COMMUNITY
Start: 2020-12-01

## 2020-12-25 RX ORDER — TESTOSTERONE GEL, 1% 10 MG/G
400 GEL TRANSDERMAL DAILY
COMMUNITY

## 2020-12-25 ASSESSMENT — ENCOUNTER SYMPTOMS
COUGH: 1
CHEST TIGHTNESS: 0
DIARRHEA: 0
NAUSEA: 0
VOMITING: 0
SORE THROAT: 0
EYE REDNESS: 0
WHEEZING: 1
ABDOMINAL PAIN: 0
SHORTNESS OF BREATH: 1
TROUBLE SWALLOWING: 0
EYE PAIN: 0

## 2020-12-26 NOTE — PROGRESS NOTES
 Arthritis Father     Heart Disease Father     Depression Sister     Arthritis Sister        Patient's medications, allergies, past medical, surgical, social and family histories were reviewed and updated as appropriate. Current Outpatient Medications on File Prior to Visit   Medication Sig Dispense Refill    carvedilol (COREG) 3.125 MG tablet       fluticasone (VERAMYST) 27.5 MCG/SPRAY nasal spray 2 sprays by Nasal route daily      loratadine (CLARITIN) 5 MG chewable tablet Take 5 mg by mouth daily      montelukast (SINGULAIR) 10 MG tablet       testosterone (ANDROGEL; TESTIM) 50 MG/5GM (1%) GEL 1% gel Place 400 mg onto the skin daily.  ofloxacin (OCUFLOX) 0.3 % solution Apply 1 drop to eye daily      prednisoLONE acetate (PRED FORTE) 1 % ophthalmic suspension Apply 1 drop to eye daily      diazePAM (VALIUM) 5 MG tablet Take 1 tablet by mouth every 12 hours as needed for Anxiety or Sleep for up to 30 days. 60 tablet 0    B-D 3CC LUER-RACHAEL SYR 23GX1\" 23G X 1\" 3 ML MISC       triamterene-hydroCHLOROthiazide (MAXZIDE-25) 37.5-25 MG per tablet Take 1 tablet by mouth daily 30 tablet 5    desloratadine (CLARINEX) 5 MG tablet Take 1 tablet by mouth daily 90 tablet 1    ondansetron (ZOFRAN) 4 MG tablet Take 1 tablet by mouth daily as needed for Nausea or Vomiting 30 tablet 5    fenofibrate (TRICOR) 48 MG tablet Take 1 tablet by mouth daily (Patient not taking: Reported on 11/3/2020) 30 tablet 3    vitamin D (ERGOCALCIFEROL) 1.25 MG (87899 UT) CAPS capsule Take 1 capsule by mouth once a week 4 capsule 5    lisinopril (PRINIVIL;ZESTRIL) 5 MG tablet Take 1 tablet by mouth 2 times daily 60 tablet 3    omeprazole (PRILOSEC) 40 MG delayed release capsule Take 40 mg by mouth daily  6    NARCAN 4 MG/0.1ML LIQD nasal spray 4 mg by Nasal route  0    HYDROcodone-acetaminophen (NORCO)  MG per tablet Take  tablets by mouth every 4-6 hours as needed. [place]   0  testosterone cypionate (DEPOTESTOTERONE CYPIONATE) 200 MG/ML injection INJECT 2ML EVERY 2 WEEKS  5    VENTOLIN  (90 Base) MCG/ACT inhaler   6    ASPIR-LOW 81 MG EC tablet Take 1 tablet by mouth daily  11    fluticasone (FLONASE) 50 MCG/ACT nasal spray 1 spray by Nasal route daily 1 Bottle 3     No current facility-administered medications on file prior to visit. Review of Systems   Constitutional: Positive for fatigue. Negative for chills and fever. HENT: Negative for congestion, ear pain, nosebleeds, sore throat and trouble swallowing. Eyes: Negative for pain and redness. Respiratory: Positive for cough, shortness of breath (at times, continues to smoke cigarettes daily) and wheezing. Negative for chest tightness. Recently referred and seen by Cardiologist and has a follow-up appointment with them. Cardiovascular: Positive for leg swelling. Negative for chest pain and palpitations. Gastrointestinal: Negative for abdominal pain, diarrhea, nausea and vomiting. Genitourinary: Negative for difficulty urinating, dysuria and flank pain. Musculoskeletal: Negative for arthralgias and gait problem. Skin: Negative for pallor and rash. Neurological: Negative for dizziness, numbness and headaches. Psychiatric/Behavioral: Positive for sleep disturbance (chronic-stable). Negative for agitation. The patient is nervous/anxious (chronic-stable). OBJECTIVE:  There were no vitals taken for this visit. Due to the current efforts to prevent transmission of COVID-19 and also the need to preserve PPE for other caregivers, a face-to-face encounter with the patient was not performed. That being said, all relevant records and diagnostic tests were reviewed, including laboratory results and imaging. Please reference any relevant documentation elsewhere. Care will be coordinated with the primary service. No results found for requested labs within last 30 days. Hemoglobin A1C (%)   Date Value   12/26/2018 5.8     LDL Calculated (mg/dL)   Date Value   07/02/2020 76         Lab Results   Component Value Date    WBC 11.7 07/21/2020    NEUTROABS 7.3 07/21/2020    HGB 17.3 07/21/2020    HCT 50.7 07/21/2020    MCV 91 07/21/2020     07/21/2020       Lab Results   Component Value Date    TSH 1.320 07/02/2020         Wiliam Dunham was seen today for follow-up. Diagnoses and all orders for this visit:    Congestive heart failure, unspecified HF chronicity, unspecified heart failure type ([address]  Continue with current medications prescribed by Cardiologist. Stop Smoking. F/u with Cardiology as scheduled. Requesting cardiology noted. Pt was started on Enestro and tolerating it well. DASH diet discussed. Fatigue, unspecified type  Will follow-up with pulmonology at in 2021 for further workup on sleep apnea. Patient tolerating all prescribed medications without any adverse side effects. Continues with current plan of care in treating diagnosis. Continue to stay well hydrated throughout the day, limit caffeine, regular exercise discussed with the patient. Heathy diet discussed with the patient. Tobacco abuse  SMOKING CESSATION STRONGLY ADVISED. CESSATION AND MAINTENANCE MEASURES DISCUSSED. Anxiety  Continue Valium 5 mg PO BID PRN. Tolerating well. Helping with anxiety and insomnia symptoms. Telephone encounter 15 minutes spent with the patient, per patient scheduling. Controlled Substances Monitoring:     RX Monitoring 11/3/2020   Attestation -   Acute Pain Prescriptions -   Periodic Controlled Substance Monitoring Possible medication side effects, risk of tolerance/dependence & alternative treatments discussed. Re-evaluated the status of the patient's underlying condition causing pain.     -        PATIENT COUNSELING Counseling was provided today regarding the following topics: Healthy eating habits, Regular exercise, substance abuse and healthy sleep habits. Discussed use, benefit, and side effects of prescribed medications. Barriers to medication compliance addressed. All patient questions answered. Patient voiced understanding. There are no discontinued medications. Return in about 1 month (around 1/7/2021), or if symptoms worsen or fail to improve, for VV. ANIYA Thayer CNP     Education was provided for discussed topics. Call the office with worsening complaints or any side effects to any medications. If an emergency please call 911.

## 2021-01-05 DIAGNOSIS — G47.00 INSOMNIA, UNSPECIFIED TYPE: ICD-10-CM

## 2021-01-05 DIAGNOSIS — F41.9 ANXIETY: ICD-10-CM

## 2021-01-05 RX ORDER — DIAZEPAM 5 MG/1
5 TABLET ORAL EVERY 12 HOURS PRN
Qty: 60 TABLET | Refills: 1 | Status: SHIPPED | OUTPATIENT
Start: 2021-01-05 | End: 2021-02-04

## 2021-01-11 ENCOUNTER — HOSPITAL ENCOUNTER (OUTPATIENT)
Facility: HOSPITAL | Age: 56
Discharge: HOME | End: 2021-01-11
Payer: MEDICARE

## 2021-01-11 PROCEDURE — U0003 INFECTIOUS AGENT DETECTION BY NUCLEIC ACID (DNA OR RNA); SEVERE ACUTE RESPIRATORY SYNDROME CORONAVIRUS 2 (SARS-COV-2) (CORONAVIRUS DISEASE [COVID-19]), AMPLIFIED PROBE TECHNIQUE, MAKING USE OF HIGH THROUGHPUT TECHNOLOGIES AS DESCRIBED BY CMS-2020-01-R: HCPCS

## 2021-01-12 LAB — SARS-COV-2: NOT DETECTED

## 2021-04-09 ENCOUNTER — APPOINTMENT (OUTPATIENT)
Dept: CT IMAGING | Facility: HOSPITAL | Age: 56
End: 2021-04-09

## 2021-04-09 ENCOUNTER — HOSPITAL ENCOUNTER (OUTPATIENT)
Dept: ULTRASOUND IMAGING | Facility: HOSPITAL | Age: 56
Discharge: HOME | End: 2021-04-09
Payer: MEDICARE

## 2021-04-09 ENCOUNTER — HOSPITAL ENCOUNTER (EMERGENCY)
Facility: HOSPITAL | Age: 56
Discharge: HOME | End: 2021-04-09
Attending: EMERGENCY MEDICINE | Admitting: EMERGENCY MEDICINE

## 2021-04-09 VITALS
BODY MASS INDEX: 34.19 KG/M2 | OXYGEN SATURATION: 98 % | WEIGHT: 258 LBS | HEIGHT: 73 IN | TEMPERATURE: 97.8 F | RESPIRATION RATE: 18 BRPM | SYSTOLIC BLOOD PRESSURE: 108 MMHG | DIASTOLIC BLOOD PRESSURE: 92 MMHG | HEART RATE: 75 BPM

## 2021-04-09 DIAGNOSIS — R06.02 SHORTNESS OF BREATH: ICD-10-CM

## 2021-04-09 DIAGNOSIS — I82.402 ACUTE DEEP VEIN THROMBOSIS (DVT) OF LEFT LOWER EXTREMITY, UNSPECIFIED VEIN (HCC): ICD-10-CM

## 2021-04-09 DIAGNOSIS — R60.0 LOCALIZED EDEMA: ICD-10-CM

## 2021-04-09 DIAGNOSIS — M79.605 LEFT LEG PAIN: Primary | ICD-10-CM

## 2021-04-09 LAB
ALBUMIN SERPL-MCNC: 3.4 G/DL (ref 3.5–5.2)
ALBUMIN/GLOB SERPL: 1.2 G/DL
ALP SERPL-CCNC: 65 U/L (ref 39–117)
ALT SERPL W P-5'-P-CCNC: 20 U/L (ref 1–41)
ANION GAP SERPL CALCULATED.3IONS-SCNC: 9 MMOL/L (ref 5–15)
AST SERPL-CCNC: 16 U/L (ref 1–40)
BASOPHILS # BLD AUTO: 0.07 10*3/MM3 (ref 0–0.2)
BASOPHILS NFR BLD AUTO: 0.6 % (ref 0–1.5)
BILIRUB SERPL-MCNC: 0.3 MG/DL (ref 0–1.2)
BUN SERPL-MCNC: 7 MG/DL (ref 6–20)
BUN/CREAT SERPL: 7.4 (ref 7–25)
CALCIUM SPEC-SCNC: 7.6 MG/DL (ref 8.6–10.5)
CHLORIDE SERPL-SCNC: 98 MMOL/L (ref 98–107)
CO2 SERPL-SCNC: 28 MMOL/L (ref 22–29)
CREAT SERPL-MCNC: 0.95 MG/DL (ref 0.76–1.27)
DEPRECATED RDW RBC AUTO: 42.6 FL (ref 37–54)
EOSINOPHIL # BLD AUTO: 0.45 10*3/MM3 (ref 0–0.4)
EOSINOPHIL NFR BLD AUTO: 4.1 % (ref 0.3–6.2)
ERYTHROCYTE [DISTWIDTH] IN BLOOD BY AUTOMATED COUNT: 12.9 % (ref 12.3–15.4)
GFR SERPL CREATININE-BSD FRML MDRD: 82 ML/MIN/1.73
GLOBULIN UR ELPH-MCNC: 2.9 GM/DL
GLUCOSE SERPL-MCNC: 96 MG/DL (ref 65–99)
HCT VFR BLD AUTO: 36.8 % (ref 37.5–51)
HGB BLD-MCNC: 12.3 G/DL (ref 13–17.7)
IMM GRANULOCYTES # BLD AUTO: 0.04 10*3/MM3 (ref 0–0.05)
IMM GRANULOCYTES NFR BLD AUTO: 0.4 % (ref 0–0.5)
LYMPHOCYTES # BLD AUTO: 2.26 10*3/MM3 (ref 0.7–3.1)
LYMPHOCYTES NFR BLD AUTO: 20.6 % (ref 19.6–45.3)
MCH RBC QN AUTO: 29.9 PG (ref 26.6–33)
MCHC RBC AUTO-ENTMCNC: 33.4 G/DL (ref 31.5–35.7)
MCV RBC AUTO: 89.5 FL (ref 79–97)
MONOCYTES # BLD AUTO: 1.49 10*3/MM3 (ref 0.1–0.9)
MONOCYTES NFR BLD AUTO: 13.6 % (ref 5–12)
NEUTROPHILS NFR BLD AUTO: 6.66 10*3/MM3 (ref 1.7–7)
NEUTROPHILS NFR BLD AUTO: 60.7 % (ref 42.7–76)
NRBC BLD AUTO-RTO: 0 /100 WBC (ref 0–0.2)
NT-PROBNP SERPL-MCNC: 720.8 PG/ML (ref 0–900)
PLATELET # BLD AUTO: 224 10*3/MM3 (ref 140–450)
PMV BLD AUTO: 9.9 FL (ref 6–12)
POTASSIUM SERPL-SCNC: 3.3 MMOL/L (ref 3.5–5.2)
PROT SERPL-MCNC: 6.3 G/DL (ref 6–8.5)
RBC # BLD AUTO: 4.11 10*6/MM3 (ref 4.14–5.8)
SODIUM SERPL-SCNC: 135 MMOL/L (ref 136–145)
TROPONIN T SERPL-MCNC: <0.01 NG/ML (ref 0–0.03)
WBC # BLD AUTO: 10.97 10*3/MM3 (ref 3.4–10.8)

## 2021-04-09 PROCEDURE — 80053 COMPREHEN METABOLIC PANEL: CPT | Performed by: PHYSICIAN ASSISTANT

## 2021-04-09 PROCEDURE — 71275 CT ANGIOGRAPHY CHEST: CPT

## 2021-04-09 PROCEDURE — 0 IOPAMIDOL PER 1 ML: Performed by: EMERGENCY MEDICINE

## 2021-04-09 PROCEDURE — 96374 THER/PROPH/DIAG INJ IV PUSH: CPT

## 2021-04-09 PROCEDURE — 85025 COMPLETE CBC W/AUTO DIFF WBC: CPT | Performed by: PHYSICIAN ASSISTANT

## 2021-04-09 PROCEDURE — 83880 ASSAY OF NATRIURETIC PEPTIDE: CPT | Performed by: PHYSICIAN ASSISTANT

## 2021-04-09 PROCEDURE — 93005 ELECTROCARDIOGRAM TRACING: CPT | Performed by: EMERGENCY MEDICINE

## 2021-04-09 PROCEDURE — 84484 ASSAY OF TROPONIN QUANT: CPT | Performed by: PHYSICIAN ASSISTANT

## 2021-04-09 PROCEDURE — 99284 EMERGENCY DEPT VISIT MOD MDM: CPT

## 2021-04-09 PROCEDURE — 25010000002 HYDROMORPHONE PER 4 MG: Performed by: EMERGENCY MEDICINE

## 2021-04-09 PROCEDURE — 93971 EXTREMITY STUDY: CPT

## 2021-04-09 RX ORDER — RIVAROXABAN 15 MG-20MG
KIT ORAL
Qty: 51 TABLET | Refills: 0 | Status: SHIPPED | OUTPATIENT
Start: 2021-04-09

## 2021-04-09 RX ORDER — HYDROMORPHONE HYDROCHLORIDE 1 MG/ML
0.5 INJECTION, SOLUTION INTRAMUSCULAR; INTRAVENOUS; SUBCUTANEOUS ONCE
Status: COMPLETED | OUTPATIENT
Start: 2021-04-09 | End: 2021-04-09

## 2021-04-09 RX ADMIN — RIVAROXABAN 15 MG: 15 TABLET, FILM COATED ORAL at 23:46

## 2021-04-09 RX ADMIN — IOPAMIDOL 85 ML: 755 INJECTION, SOLUTION INTRAVENOUS at 22:15

## 2021-04-09 RX ADMIN — HYDROMORPHONE HYDROCHLORIDE 0.5 MG: 1 INJECTION, SOLUTION INTRAMUSCULAR; INTRAVENOUS; SUBCUTANEOUS at 21:36

## 2021-04-10 NOTE — ED PROVIDER NOTES
Subjective   Patient is a 56-year-old male presents emergency room today with complaints of left lower leg pain.  Patient states that for approximately the last 3 to 4 days he has experienced increased pain and swelling in his left leg.  He denies anything specific that makes his symptoms better or worse.  He shares that he is active and has been walking tractors and that it does not seem to be worsened with activity.  He reports associated symptom of shortness of breath.  Patient states that he is a smoker but that recently he feels as if he has been more short of breath.  He denies any additional associated symptoms on interview and exam.  Patient's daughter works for a primary care office and [place], [place] where an ultrasound was ordered and obtained and demonstrated a left lower extremity DVT.  Patient's primary care office shared that he was to come to the emergency room to be admitted to the hospital for treatment of his DVT.          Review of Systems   Respiratory: Positive for shortness of breath.    Musculoskeletal: Positive for arthralgias and myalgias.   All other systems reviewed and are negative.      Past Medical History:   Diagnosis Date   • Osteoarthritis        Allergies   Allergen Reactions   • Nsaids Anaphylaxis   • Penicillins Other (See Comments)     As a young child, unable to remember       Past Surgical History:   Procedure Laterality Date   • GALLBLADDER SURGERY         Family History   Problem Relation Age of Onset   • Osteoarthritis Mother    • Cancer Father        Social History     Socioeconomic History   • Marital status: Single     Spouse name: Not on file   • Number of children: Not on file   • Years of education: Not on file   • Highest education level: Not on file   Tobacco Use   • Smoking status: Current Some Day Smoker     Types: Cigarettes     Start date: 1980   • Smokeless tobacco: Never Used   Substance and Sexual Activity   • Alcohol use: Never   • Drug use: Never   •  Sexual activity: Defer           Objective   Physical Exam  Vitals and nursing note reviewed.   Constitutional:       General: He is not in acute distress.     Appearance: Normal appearance. He is well-developed. He is obese. He is not ill-appearing, toxic-appearing or diaphoretic.   HENT:      Head: Normocephalic and atraumatic.      Nose: Nose normal.      Mouth/Throat:      Mouth: Mucous membranes are moist.   Eyes:      Extraocular Movements: Extraocular movements intact.      Conjunctiva/sclera: Conjunctivae normal.   Cardiovascular:      Rate and Rhythm: Normal rate and regular rhythm.      Pulses: Normal pulses.   Pulmonary:      Effort: Pulmonary effort is normal. No respiratory distress.      Breath sounds: Normal breath sounds.   Abdominal:      General: There is no distension.      Palpations: Abdomen is soft.      Tenderness: There is no abdominal tenderness.   Musculoskeletal:         General: No tenderness or deformity. Normal range of motion.      Cervical back: Normal range of motion and neck supple.      Left lower leg: Edema present.   Skin:     General: Skin is warm and dry.   Neurological:      General: No focal deficit present.      Mental Status: He is alert.   Psychiatric:         Behavior: Behavior normal.         Thought Content: Thought content normal.         Judgment: Judgment normal.         Procedures           ED Course  ED Course as of Apr 10 0540   Fri Apr 09, 2021 2110 Notified by nursing that patient is having some increased pain in his leg. Pain medication ordered.     [JG]   Sat Apr 10, 2021   0538 Patient presents to ED for evaluation of left lower extremity pain and swelling, positive for left lower extremity DVT per outpatient ultrasound.  Also with shortness of breath.  No additional acute or emergent findings on labs.  Negative troponin.  proBNP normal.  CTA of chest without findings of PE or acute findings in the chest.  Patient given 1 dose of Xarelto while in the  emergency department and prescribed starter pack for additional outpatient treatment of his DVT.  Patient is afebrile, nontoxic appearing, vital signs stable and able to maintain O2 sats of 98% on room air. Patient will be discharged home with outpatient follow up to their primary care provider and cardiologist as recommended. Patient is agreeable to plan of care of outpatient follow up, provided clear return precautions and demonstrated understanding.    [JG]      ED Course User Index  [JG] Cliff Paz, PA      Recent Results (from the past 24 hour(s))   Comprehensive Metabolic Panel    Collection Time: 04/09/21  8:18 PM    Specimen: Blood   Result Value Ref Range    Glucose 96 65 - 99 mg/dL    BUN 7 6 - 20 mg/dL    Creatinine 0.95 0.76 - 1.27 mg/dL    Sodium 135 (L) 136 - 145 mmol/L    Potassium 3.3 (L) 3.5 - 5.2 mmol/L    Chloride 98 98 - 107 mmol/L    CO2 28.0 22.0 - 29.0 mmol/L    Calcium 7.6 (L) 8.6 - 10.5 mg/dL    Total Protein 6.3 6.0 - 8.5 g/dL    Albumin 3.40 (L) 3.50 - 5.20 g/dL    ALT (SGPT) 20 1 - 41 U/L    AST (SGOT) 16 1 - 40 U/L    Alkaline Phosphatase 65 39 - 117 U/L    Total Bilirubin 0.3 0.0 - 1.2 mg/dL    eGFR Non African Amer 82 >60 mL/min/1.73    Globulin 2.9 gm/dL    A/G Ratio 1.2 g/dL    BUN/Creatinine Ratio 7.4 7.0 - 25.0    Anion Gap 9.0 5.0 - 15.0 mmol/L   Troponin    Collection Time: 04/09/21  8:18 PM    Specimen: Blood   Result Value Ref Range    Troponin T <0.010 0.000 - 0.030 ng/mL   CBC Auto Differential    Collection Time: 04/09/21  8:18 PM    Specimen: Blood   Result Value Ref Range    WBC 10.97 (H) 3.40 - 10.80 10*3/mm3    RBC 4.11 (L) 4.14 - 5.80 10*6/mm3    Hemoglobin 12.3 (L) 13.0 - 17.7 g/dL    Hematocrit 36.8 (L) 37.5 - 51.0 %    MCV 89.5 79.0 - 97.0 fL    MCH 29.9 26.6 - 33.0 pg    MCHC 33.4 31.5 - 35.7 g/dL    RDW 12.9 12.3 - 15.4 %    RDW-SD 42.6 37.0 - 54.0 fl    MPV 9.9 6.0 - 12.0 fL    Platelets 224 140 - 450 10*3/mm3    Neutrophil % 60.7 42.7 - 76.0 %    Lymphocyte %  20.6 19.6 - 45.3 %    Monocyte % 13.6 (H) 5.0 - 12.0 %    Eosinophil % 4.1 0.3 - 6.2 %    Basophil % 0.6 0.0 - 1.5 %    Immature Grans % 0.4 0.0 - 0.5 %    Neutrophils, Absolute 6.66 1.70 - 7.00 10*3/mm3    Lymphocytes, Absolute 2.26 0.70 - 3.10 10*3/mm3    Monocytes, Absolute 1.49 (H) 0.10 - 0.90 10*3/mm3    Eosinophils, Absolute 0.45 (H) 0.00 - 0.40 10*3/mm3    Basophils, Absolute 0.07 0.00 - 0.20 10*3/mm3    Immature Grans, Absolute 0.04 0.00 - 0.05 10*3/mm3    nRBC 0.0 0.0 - 0.2 /100 WBC   BNP    Collection Time: 04/09/21  8:18 PM    Specimen: Blood   Result Value Ref Range    proBNP 720.8 0.0 - 900.0 pg/mL     Note: In addition to lab results from this visit, the labs listed above may include labs taken at another facility or during a different encounter within the last 24 hours. Please correlate lab times with ED admission and discharge times for further clarification of the services performed during this visit.    CT Angiogram Chest   Final Result   No CT evidence pulmonary embolus.      Tiny right effusion      Otherwise normal      Signer Name: Nino Wolf MD    Signed: 4/9/2021 10:48 PM    Workstation Name: [email or web address]     [facility]        Vitals:    04/09/21 2230 04/09/21 2231 04/09/21 2300 04/09/21 2347   BP: 110/62 110/62 128/71 108/92   BP Location:  Left arm  Right arm   Patient Position:  Sitting  Sitting   Pulse:  75     Resp:  18  18   Temp:       TempSrc:       SpO2: 93% 93% 97% 98%   Weight:       Height:         Medications   HYDROmorphone (DILAUDID) injection 0.5 mg (0.5 mg Intravenous Given 4/9/21 2136)   iopamidol (ISOVUE-370) 76 % injection 100 mL (85 mL Intravenous Given 4/9/21 2215)   iopamidol (ISOVUE-370) 76 % injection 100 mL (85 mL Intravenous Given 4/9/21 8313)   rivaroxaban (XARELTO) tablet 15 mg (15 mg Oral Given 4/9/21 0512)     ECG/EMG Results (last 24 hours)     ** No results found for the last 24 hours. **        ECG 12 Lead    (Results Pending)                                           MDM  Number of Diagnoses or Management Options  Acute deep vein thrombosis (DVT) of left lower extremity, unspecified vein (CMS/HCC): new and requires workup  Left leg pain: new and requires workup  Shortness of breath: new and requires workup     Amount and/or Complexity of Data Reviewed  Clinical lab tests: reviewed  Tests in the radiology section of CPT®: reviewed    Risk of Complications, Morbidity, and/or Mortality  Presenting problems: high  Diagnostic procedures: moderate  Management options: moderate    Patient Progress  Patient progress: stable      Final diagnoses:   Left leg pain   Shortness of breath   Acute deep vein thrombosis (DVT) of left lower extremity, unspecified vein (CMS/HCC)       ED Disposition  ED Disposition     ED Disposition Condition Comment    Discharge Stable           Johnie Lee, DO  [address] [place] [address]  [phone]      As needed    Johnie Anaya MD  [address] [place] [address]  [phone]      Keep appointment as scheduled with cardiology for follow-up.    [facility] Emergency Department  [address] [facility] [address] [phone]  Go to   If symptoms worsen         Medication List      New Prescriptions    Xarelto Starter Pack tablet therapy pack starter pack  Generic drug: Rivaroxaban  Take one 15 mg tablet twice daily with food for 21 days.  Followed by one 20 mg tablet by mouth once daily with food. Take as directed           Where to Get Your Medications      These medications were sent to [facility] - [place], [place] - [facility] [address] - [phone]  - [phone] [facility] [place] [address], [facility] [address]    Phone: [phone]   · Xarelto Starter Pack tablet therapy pack starter pack          Cliff Paz PA  04/10/21 7233

## 2021-04-12 LAB
QT INTERVAL: 442 MS
QTC INTERVAL: 497 MS

## 2021-05-10 ENCOUNTER — HOSPITAL ENCOUNTER (OUTPATIENT)
Dept: MRI IMAGING | Facility: HOSPITAL | Age: 56
Discharge: HOME | End: 2021-05-10
Payer: MEDICARE

## 2021-05-10 DIAGNOSIS — F44.89 OTHER DISSOCIATIVE AND CONVERSION DISORDERS: ICD-10-CM

## 2021-05-10 LAB
BUN BLDV-MCNC: 9 MG/DL (ref 6–20)
CREAT SERPL-MCNC: 0.9 MG/DL (ref 0.4–1.2)
GFR AFRICAN AMERICAN: >59
GFR NON-AFRICAN AMERICAN: >59

## 2021-05-10 PROCEDURE — A9579 GAD-BASE MR CONTRAST NOS,1ML: HCPCS | Performed by: FAMILY MEDICINE

## 2021-05-10 PROCEDURE — 70553 MRI BRAIN STEM W/O & W/DYE: CPT

## 2021-05-10 PROCEDURE — 36415 COLL VENOUS BLD VENIPUNCTURE: CPT

## 2021-05-10 PROCEDURE — 82565 ASSAY OF CREATININE: CPT

## 2021-05-10 PROCEDURE — 84520 ASSAY OF UREA NITROGEN: CPT

## 2021-05-10 PROCEDURE — 6360000004 HC RX CONTRAST MEDICATION: Performed by: FAMILY MEDICINE

## 2021-05-10 RX ADMIN — GADOTERIDOL 20 ML: 279.3 INJECTION, SOLUTION INTRAVENOUS at 10:45

## 2025-06-16 NOTE — PROGRESS NOTES
SUBJECTIVE:    Patient ID: Enrike Tobias is a 53 y.o. male. Chief Complaint   Patient presents with    Anxiety    Medication Refill       HPI     Weight History: Wt Readings from Last 3 Encounters:  06/20/19 : 274 lb (124.3 kg)  05/20/19 : 270 lb 3.2 oz (122.6 kg)  04/22/19 : 267 lb (121.1 kg)    Active Ambulatory Problems     Diagnosis Date Noted    RA (rheumatoid arthritis) ([address] 11/30/2012    Spinal stenosis 11/30/2012    Anxiety 08/21/2015    Headache 08/21/2015    Hypertension 08/21/2015    Low testosterone in male 03/12/2018    Mixed hyperlipidemia 06/14/2018     Resolved Ambulatory Problems     Diagnosis Date Noted    Cellulitis of groin, right 11/30/2012    Abdominal pain, acute, generalized 02/18/2013    Rectal bleeding 03/11/2013    Chest pain 03/17/2014     Past Medical History:   Diagnosis Date    Arthritis     Chronic back pain     Depression     Fibromyalgia     Reflux esophagitis     Spinal stenosis       Allergies   Allergen Reactions    Dicyclomine Hcl     Pcn [Penicillins]     Tolectin [Tolmetin Sodium]       Past Surgical History:   Procedure Laterality Date    CHOLECYSTECTOMY      COLONOSCOPY      SKIN BIOPSY      TONSILLECTOMY        Family History   Problem Relation Age of Onset    Arthritis Mother     Asthma Mother     Heart Disease Mother     High Blood Pressure Mother     Arthritis Father     Heart Disease Father     Depression Sister     Arthritis Sister        Patient's medications, allergies, past medical, surgical, social and family histories were reviewed and updated as appropriate.     Current Outpatient Medications on File Prior to Visit   Medication Sig Dispense Refill    methylPREDNISolone (MEDROL DOSEPACK) 4 MG tablet Take by mouth 141800 stop 21 tablet 0    methylPREDNISolone (MEDROL DOSEPACK) 4 MG tablet Take by mouth 137053 stop 21 tablet 0    HYDROcodone-acetaminophen (NORCO)  MG per tablet Take  tablets by mouth every 4-6 hours as needed. Kathi Hoyles 0    desloratadine (CLARINEX) 5 MG tablet Take 1 tablet by mouth daily 90 tablet 1    lisinopril (PRINIVIL;ZESTRIL) 5 MG tablet Take 1 tablet by mouth 2 times daily 60 tablet 3    pravastatin (PRAVACHOL) 20 MG tablet Take 1 tablet by mouth daily 30 tablet 2    triamterene-hydrochlorothiazide (MAXZIDE-25) 37.5-25 MG per tablet Take 1 tablet by mouth daily 30 tablet 11    testosterone cypionate (DEPOTESTOTERONE CYPIONATE) 200 MG/ML injection INJECT 2ML EVERY 2 WEEKS  5    budesonide-formoterol (SYMBICORT) 160-4.5 MCG/ACT AERO Inhale 2 puffs into the lungs 2 times daily      VENTOLIN  (90 Base) MCG/ACT inhaler   6    ASPIR-LOW 81 MG EC tablet Take 1 tablet by mouth daily  11    ipratropium (ATROVENT) 0.03 % nasal spray 1 spray by Nasal route 2 times daily  3    omeprazole (PRILOSEC) 40 MG delayed release capsule Take 1 capsule by mouth daily  6    montelukast (SINGULAIR) 10 MG tablet Take 10 mg by mouth nightly      fluticasone (FLONASE) 50 MCG/ACT nasal spray 1 spray by Nasal route daily 1 Bottle 3     No current facility-administered medications on file prior to visit. Review of Systems    OBJECTIVE:  /83   Pulse 74   Resp 18   Ht 6' 1\" (1.854 m)   Wt 274 lb (124.3 kg)   SpO2 95%   BMI 36.15 kg/m²       Physical Exam   Constitutional: He is oriented to person, place, and time. Vital signs are normal. He appears well-developed and well-nourished. He is active. Non-toxic appearance. He does not have a sickly appearance. He does not appear ill. No distress. HENT:   Head: Normocephalic and atraumatic. Right Ear: Hearing, tympanic membrane, external ear and ear canal normal.   Left Ear: Hearing, tympanic membrane, external ear and ear canal normal.   Nose: Nose normal. No mucosal edema, rhinorrhea or nose lacerations. Right sinus exhibits no maxillary sinus tenderness and no frontal sinus tenderness.  Left sinus exhibits no maxillary sinus tenderness is 6.   Skin: Skin is warm, dry and intact. Capillary refill takes less than 2 seconds. No bruising and no rash noted. He is not diaphoretic. No erythema. No pallor. Psychiatric: He has a normal mood and affect. His speech is normal and behavior is normal. Judgment and thought content normal. Cognition and memory are normal.   Nursing note and vitals reviewed. No results found for requested labs within last 30 days. Hemoglobin A1C (%)   Date Value   12/26/2018 5.8     LDL Calculated (mg/dL)   Date Value   12/26/2018 73       Lab Results   Component Value Date    WBC 12.1 12/26/2018    NEUTROABS 7.4 12/26/2018    HGB 17.3 12/26/2018    HCT 50.2 12/26/2018    MCV 89 12/26/2018     12/26/2018     Lab Results   Component Value Date    TSH 1.310 12/26/2018         ASSESSMENT/PLAN:     Biju Leon was seen today for anxiety and medication refill. Diagnoses and all orders for this visit:    Essential hypertension  -     chlorthalidone (HYGROTON) 25 MG tablet; Take 1 tablet by mouth daily  -     CBC Auto Differential; Future  -     Comprehensive Metabolic Panel; Future  -     TSH with Reflex; Future    Anxiety  -     diazepam (VALIUM) 5 MG tablet; Take 1 tablet by mouth every 12 hours as needed for Anxiety or Sleep for up to 30 days. -     CBC Auto Differential; Future  -     Comprehensive Metabolic Panel; Future  -     TSH with Reflex; Future  -     POCT glycosylated hemoglobin (Hb A1C)    Insomnia, unspecified type  -     diazepam (VALIUM) 5 MG tablet; Take 1 tablet by mouth every 12 hours as needed for Anxiety or Sleep for up to 30 days. Vitamin D deficiency  -     ergocalciferol (ERGOCALCIFEROL) 15909 units capsule; Take 1 capsule by mouth once a week    Other fatigue  -     CBC Auto Differential; Future  -     Comprehensive Metabolic Panel; Future  -     TSH with Reflex;  Future  -     POCT glycosylated hemoglobin (Hb A1C)    Abnormal finding of blood chemistry   -     POCT glycosylated hemoglobin (Hb [facility]  Internal Medicine  [address] [place] [address]  Phone: [phone]  Fax: [phone]  Follow Up Time: 1 week